# Patient Record
Sex: FEMALE | Race: WHITE | Employment: OTHER | URBAN - METROPOLITAN AREA
[De-identification: names, ages, dates, MRNs, and addresses within clinical notes are randomized per-mention and may not be internally consistent; named-entity substitution may affect disease eponyms.]

---

## 2024-05-24 ENCOUNTER — HOSPITAL ENCOUNTER (EMERGENCY)
Age: 89
Discharge: HOME | End: 2024-05-24
Attending: EMERGENCY MEDICINE
Payer: COMMERCIAL

## 2024-05-24 ENCOUNTER — APPOINTMENT (OUTPATIENT)
Dept: GENERAL RADIOLOGY | Age: 89
End: 2024-05-24
Payer: COMMERCIAL

## 2024-05-24 VITALS
OXYGEN SATURATION: 97 % | RESPIRATION RATE: 18 BRPM | HEART RATE: 76 BPM | SYSTOLIC BLOOD PRESSURE: 198 MMHG | HEIGHT: 66 IN | TEMPERATURE: 98 F | WEIGHT: 132.28 LBS | DIASTOLIC BLOOD PRESSURE: 91 MMHG | BODY MASS INDEX: 21.26 KG/M2

## 2024-05-24 DIAGNOSIS — S91.209A TOENAIL AVULSION, INITIAL ENCOUNTER: Primary | ICD-10-CM

## 2024-05-24 PROCEDURE — 73660 X-RAY EXAM OF TOE(S): CPT

## 2024-05-24 PROCEDURE — 99283 EMERGENCY DEPT VISIT LOW MDM: CPT

## 2024-05-24 RX ORDER — BUPIVACAINE HYDROCHLORIDE 2.5 MG/ML
30 INJECTION, SOLUTION EPIDURAL; INFILTRATION; INTRACAUDAL ONCE
Status: DISCONTINUED | OUTPATIENT
Start: 2024-05-24 | End: 2024-05-24 | Stop reason: HOSPADM

## 2024-05-24 ASSESSMENT — ENCOUNTER SYMPTOMS: RESPIRATORY NEGATIVE: 1

## 2024-05-24 NOTE — DISCHARGE INSTRUCTIONS
You are seen in the emergency department today for evaluation of a left great toe/nail injury.  Your toenail was removed, and a dressing was placed.  Please keep this dressing in place for the next 24 hours, then change the dressing daily, use antibacterial ointment on top of the nonocclusive dressing that was placed on the nailbed.  Please follow-up with podiatry in the next 1 to 2 weeks.  Recommend limiting weightbearing as needed due to pain, and taking Tylenol as needed for pain.    Return to the ED if you develop uncontrollable pain, redness and swelling of the toe, purulent/smelly drainage of the toe

## 2024-05-24 NOTE — ED PROVIDER NOTES
ED Attending Attestation Note     Date of evaluation: 5/24/2024    This patient was seen by the advance practice provider.  I have seen and examined the patient, agree with the workup, evaluation, management and diagnosis. The care plan has been discussed.      The patient's clinical history is as follows:    Chief Complaint     Toe Injury      History of Present Illness     Jonna Erickson is a 89 y.o. female who presents to the Emergency Department after she hit her left toe on a another object yesterday and her great toenail became significantly elevated off of the nailbed and has had residual and continued bleeding since that time.    Past Medical, Surgical, Family, and Social History     She has no past medical history on file.  She has no past surgical history on file.  Her family history is not on file.  She reports that she has never smoked. She does not have any smokeless tobacco history on file. She reports current alcohol use. She reports that she does not use drugs.      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    
  Clinical Impression     1. Toenail avulsion, initial encounter        Disposition     PATIENT REFERRED TO:  No follow-up provider specified.    DISCHARGE MEDICATIONS:  New Prescriptions    No medications on file       DISPOSITION Decision To Discharge 05/24/2024 04:06:33 PM        Diagnostic Results And Other Data         RADIOLOGY:  XR TOE LEFT (MIN 2 VIEWS)   Final Result   1. No acute abnormality of the left great toe.          LABS:   No results found for this visit on 05/24/24.      ED BEDSIDE ULTRASOUND:  No results found.    MOST RECENT VITALS:  BP: (!) 198/91, Temp: 98 °F (36.7 °C), Pulse: 76, Respirations: 18, SpO2: 97 %     Nail Removal    Date/Time: 5/24/2024 4:45 PM    Performed by: Raquel Pascal APRN - CNP  Authorized by: Belia Alonzo MD    Consent:     Consent obtained:  Verbal    Consent given by:  Patient    Risks discussed:  Bleeding, pain and permanent nail deformity  Universal protocol:     Patient identity confirmed:  Verbally with patient  Location:     Foot:  L big toe  Pre-procedure details:     Skin preparation:  Alcohol  Anesthesia:     Anesthesia method:  Nerve block    Block anesthetic:  Bupivacaine 0.25% w/o epi    Block technique:  Digital    Block injection procedure:  Anatomic landmarks identified, introduced needle, incremental injection and negative aspiration for blood    Block outcome:  Anesthesia achieved  Nail Removal:     Nail removed:  Complete    Nail bed repaired: no      Removed nail replaced and anchored: no      Stented with:  Adaptic dressing        ED Course     Nursing Notes, Past Medical Hx, Past Surgical Hx, Social Hx, Allergies, and Family Hx were reviewed.         The patient was given the following medications:  Orders Placed This Encounter   Medications   • BUPivacaine (PF) (MARCAINE) 0.25 % injection 75 mg       CONSULTS:  None    Review of Systems     Review of Systems   Constitutional: Negative.    Respiratory: Negative.     Cardiovascular: Negative.

## 2024-11-08 ENCOUNTER — HOSPITAL ENCOUNTER (INPATIENT)
Age: 89
LOS: 1 days | Discharge: HOME | DRG: 194 | End: 2024-11-09
Attending: EMERGENCY MEDICINE | Admitting: INTERNAL MEDICINE
Payer: MEDICARE

## 2024-11-08 ENCOUNTER — APPOINTMENT (OUTPATIENT)
Dept: GENERAL RADIOLOGY | Age: 89
DRG: 194 | End: 2024-11-08
Payer: MEDICARE

## 2024-11-08 ENCOUNTER — APPOINTMENT (OUTPATIENT)
Dept: CT IMAGING | Age: 89
DRG: 194 | End: 2024-11-08
Payer: MEDICARE

## 2024-11-08 DIAGNOSIS — J90 BILATERAL PLEURAL EFFUSION: ICD-10-CM

## 2024-11-08 DIAGNOSIS — R91.8 MASS OF LOWER LOBE OF LEFT LUNG: Primary | ICD-10-CM

## 2024-11-08 DIAGNOSIS — R91.8 LUNG MASS: ICD-10-CM

## 2024-11-08 LAB
ALBUMIN SERPL-MCNC: 3 G/DL (ref 3.4–5)
ALP SERPL-CCNC: 48 U/L (ref 40–129)
ALT SERPL-CCNC: 8 U/L (ref 10–40)
ANION GAP SERPL CALCULATED.3IONS-SCNC: 8 MMOL/L (ref 3–16)
AST SERPL-CCNC: 27 U/L (ref 15–37)
BASOPHILS # BLD: 0 K/UL (ref 0–0.2)
BASOPHILS NFR BLD: 0.5 %
BILIRUB DIRECT SERPL-MCNC: <0.1 MG/DL (ref 0–0.3)
BILIRUB INDIRECT SERPL-MCNC: 0.2 MG/DL (ref 0–1)
BILIRUB SERPL-MCNC: 0.3 MG/DL (ref 0–1)
BUN SERPL-MCNC: 19 MG/DL (ref 7–20)
CALCIUM SERPL-MCNC: 8.1 MG/DL (ref 8.3–10.6)
CHLORIDE SERPL-SCNC: 97 MMOL/L (ref 99–110)
CO2 SERPL-SCNC: 22 MMOL/L (ref 21–32)
CREAT SERPL-MCNC: 0.8 MG/DL (ref 0.6–1.2)
DEPRECATED RDW RBC AUTO: 16.3 % (ref 12.4–15.4)
EOSINOPHIL # BLD: 0 K/UL (ref 0–0.6)
EOSINOPHIL NFR BLD: 0.3 %
FLUAV RNA RESP QL NAA+PROBE: NOT DETECTED
FLUBV RNA RESP QL NAA+PROBE: NOT DETECTED
GFR SERPLBLD CREATININE-BSD FMLA CKD-EPI: 71 ML/MIN/{1.73_M2}
GLUCOSE SERPL-MCNC: 97 MG/DL (ref 70–99)
HCT VFR BLD AUTO: 39 % (ref 36–48)
HGB BLD-MCNC: 12.6 G/DL (ref 12–16)
LIPASE SERPL-CCNC: 21 U/L (ref 13–60)
LYMPHOCYTES # BLD: 2.2 K/UL (ref 1–5.1)
LYMPHOCYTES NFR BLD: 32.6 %
MAGNESIUM SERPL-MCNC: 1.87 MG/DL (ref 1.8–2.4)
MCH RBC QN AUTO: 28.9 PG (ref 26–34)
MCHC RBC AUTO-ENTMCNC: 32.3 G/DL (ref 31–36)
MCV RBC AUTO: 89.4 FL (ref 80–100)
MONOCYTES # BLD: 0.8 K/UL (ref 0–1.3)
MONOCYTES NFR BLD: 11.8 %
NEUTROPHILS # BLD: 3.8 K/UL (ref 1.7–7.7)
NEUTROPHILS NFR BLD: 54.8 %
OSMOLALITY SERPL: 283 MOSM/KG (ref 278–305)
PHOSPHATE SERPL-MCNC: 3.2 MG/DL (ref 2.5–4.9)
PLATELET # BLD AUTO: 358 K/UL (ref 135–450)
PMV BLD AUTO: 6.5 FL (ref 5–10.5)
POTASSIUM SERPL-SCNC: 4.3 MMOL/L (ref 3.5–5.1)
PROCALCITONIN SERPL IA-MCNC: 0.16 NG/ML (ref 0–0.15)
PROT SERPL-MCNC: 6.4 G/DL (ref 6.4–8.2)
RBC # BLD AUTO: 4.37 M/UL (ref 4–5.2)
SARS-COV-2 RNA RESP QL NAA+PROBE: NOT DETECTED
SODIUM SERPL-SCNC: 127 MMOL/L (ref 136–145)
WBC # BLD AUTO: 6.9 K/UL (ref 4–11)

## 2024-11-08 PROCEDURE — 6360000004 HC RX CONTRAST MEDICATION

## 2024-11-08 PROCEDURE — 80069 RENAL FUNCTION PANEL: CPT

## 2024-11-08 PROCEDURE — 1200000000 HC SEMI PRIVATE

## 2024-11-08 PROCEDURE — 84145 PROCALCITONIN (PCT): CPT

## 2024-11-08 PROCEDURE — 83930 ASSAY OF BLOOD OSMOLALITY: CPT

## 2024-11-08 PROCEDURE — 6360000002 HC RX W HCPCS

## 2024-11-08 PROCEDURE — 36415 COLL VENOUS BLD VENIPUNCTURE: CPT

## 2024-11-08 PROCEDURE — 71260 CT THORAX DX C+: CPT

## 2024-11-08 PROCEDURE — 2580000003 HC RX 258

## 2024-11-08 PROCEDURE — 96374 THER/PROPH/DIAG INJ IV PUSH: CPT

## 2024-11-08 PROCEDURE — 87636 SARSCOV2 & INF A&B AMP PRB: CPT

## 2024-11-08 PROCEDURE — 71045 X-RAY EXAM CHEST 1 VIEW: CPT

## 2024-11-08 PROCEDURE — 96375 TX/PRO/DX INJ NEW DRUG ADDON: CPT

## 2024-11-08 PROCEDURE — 99285 EMERGENCY DEPT VISIT HI MDM: CPT

## 2024-11-08 PROCEDURE — 83735 ASSAY OF MAGNESIUM: CPT

## 2024-11-08 PROCEDURE — 85025 COMPLETE CBC W/AUTO DIFF WBC: CPT

## 2024-11-08 PROCEDURE — 87040 BLOOD CULTURE FOR BACTERIA: CPT

## 2024-11-08 PROCEDURE — 83690 ASSAY OF LIPASE: CPT

## 2024-11-08 PROCEDURE — 80076 HEPATIC FUNCTION PANEL: CPT

## 2024-11-08 PROCEDURE — 6370000000 HC RX 637 (ALT 250 FOR IP)

## 2024-11-08 RX ORDER — VITAMIN B COMPLEX
1000 TABLET ORAL DAILY
Status: DISCONTINUED | OUTPATIENT
Start: 2024-11-09 | End: 2024-11-09 | Stop reason: HOSPADM

## 2024-11-08 RX ORDER — ACETAMINOPHEN 325 MG/1
650 TABLET ORAL EVERY 6 HOURS PRN
Status: DISCONTINUED | OUTPATIENT
Start: 2024-11-08 | End: 2024-11-09 | Stop reason: HOSPADM

## 2024-11-08 RX ORDER — TURMERIC 400 MG
200 CAPSULE ORAL DAILY
COMMUNITY

## 2024-11-08 RX ORDER — ENOXAPARIN SODIUM 100 MG/ML
40 INJECTION SUBCUTANEOUS EVERY EVENING
Status: DISCONTINUED | OUTPATIENT
Start: 2024-11-08 | End: 2024-11-09 | Stop reason: HOSPADM

## 2024-11-08 RX ORDER — TRAZODONE HYDROCHLORIDE 50 MG/1
50 TABLET, FILM COATED ORAL ONCE
Status: DISCONTINUED | OUTPATIENT
Start: 2024-11-08 | End: 2024-11-09 | Stop reason: HOSPADM

## 2024-11-08 RX ORDER — IOPAMIDOL 755 MG/ML
75 INJECTION, SOLUTION INTRAVASCULAR
Status: COMPLETED | OUTPATIENT
Start: 2024-11-08 | End: 2024-11-08

## 2024-11-08 RX ORDER — ONDANSETRON 4 MG/1
4 TABLET, ORALLY DISINTEGRATING ORAL EVERY 8 HOURS PRN
Status: DISCONTINUED | OUTPATIENT
Start: 2024-11-08 | End: 2024-11-09 | Stop reason: HOSPADM

## 2024-11-08 RX ORDER — SODIUM CHLORIDE 9 MG/ML
INJECTION, SOLUTION INTRAVENOUS PRN
Status: DISCONTINUED | OUTPATIENT
Start: 2024-11-08 | End: 2024-11-09 | Stop reason: HOSPADM

## 2024-11-08 RX ORDER — ACETAMINOPHEN 650 MG/1
650 SUPPOSITORY RECTAL EVERY 6 HOURS PRN
Status: DISCONTINUED | OUTPATIENT
Start: 2024-11-08 | End: 2024-11-09 | Stop reason: HOSPADM

## 2024-11-08 RX ORDER — POTASSIUM CHLORIDE 1500 MG/1
40 TABLET, EXTENDED RELEASE ORAL PRN
Status: DISCONTINUED | OUTPATIENT
Start: 2024-11-08 | End: 2024-11-09 | Stop reason: HOSPADM

## 2024-11-08 RX ORDER — SODIUM CHLORIDE 0.9 % (FLUSH) 0.9 %
5-40 SYRINGE (ML) INJECTION PRN
Status: DISCONTINUED | OUTPATIENT
Start: 2024-11-08 | End: 2024-11-09 | Stop reason: HOSPADM

## 2024-11-08 RX ORDER — LABETALOL HYDROCHLORIDE 5 MG/ML
10 INJECTION, SOLUTION INTRAVENOUS ONCE
Status: DISCONTINUED | OUTPATIENT
Start: 2024-11-08 | End: 2024-11-09

## 2024-11-08 RX ORDER — POLYETHYLENE GLYCOL 3350 17 G/17G
17 POWDER, FOR SOLUTION ORAL DAILY PRN
Status: DISCONTINUED | OUTPATIENT
Start: 2024-11-08 | End: 2024-11-09 | Stop reason: HOSPADM

## 2024-11-08 RX ORDER — POTASSIUM CHLORIDE 7.45 MG/ML
10 INJECTION INTRAVENOUS PRN
Status: DISCONTINUED | OUTPATIENT
Start: 2024-11-08 | End: 2024-11-09 | Stop reason: HOSPADM

## 2024-11-08 RX ORDER — MAGNESIUM SULFATE IN WATER 40 MG/ML
2000 INJECTION, SOLUTION INTRAVENOUS PRN
Status: DISCONTINUED | OUTPATIENT
Start: 2024-11-08 | End: 2024-11-09 | Stop reason: HOSPADM

## 2024-11-08 RX ORDER — SODIUM CHLORIDE 0.9 % (FLUSH) 0.9 %
5-40 SYRINGE (ML) INJECTION EVERY 12 HOURS SCHEDULED
Status: DISCONTINUED | OUTPATIENT
Start: 2024-11-08 | End: 2024-11-09 | Stop reason: HOSPADM

## 2024-11-08 RX ORDER — ONDANSETRON 2 MG/ML
4 INJECTION INTRAMUSCULAR; INTRAVENOUS EVERY 6 HOURS PRN
Status: DISCONTINUED | OUTPATIENT
Start: 2024-11-08 | End: 2024-11-09 | Stop reason: HOSPADM

## 2024-11-08 RX ORDER — MECOBALAMIN 5000 MCG
5 TABLET,DISINTEGRATING ORAL ONCE
Status: DISCONTINUED | OUTPATIENT
Start: 2024-11-08 | End: 2024-11-09 | Stop reason: HOSPADM

## 2024-11-08 RX ORDER — UBIDECARENONE 75 MG
100 CAPSULE ORAL DAILY
COMMUNITY

## 2024-11-08 RX ORDER — SIROLIMUS 1 MG/1
1 TABLET, FILM COATED ORAL DAILY
Status: DISCONTINUED | OUTPATIENT
Start: 2024-11-08 | End: 2024-11-08

## 2024-11-08 RX ORDER — SIROLIMUS 1 MG/1
1 TABLET, FILM COATED ORAL DAILY
COMMUNITY
Start: 2024-10-04

## 2024-11-08 RX ORDER — UBIDECARENONE 75 MG
100 CAPSULE ORAL DAILY
Status: DISCONTINUED | OUTPATIENT
Start: 2024-11-09 | End: 2024-11-09 | Stop reason: HOSPADM

## 2024-11-08 RX ORDER — SIROLIMUS 1 MG/1
1 TABLET, FILM COATED ORAL
Status: DISCONTINUED | OUTPATIENT
Start: 2024-11-08 | End: 2024-11-09 | Stop reason: HOSPADM

## 2024-11-08 RX ADMIN — AZITHROMYCIN MONOHYDRATE 500 MG: 500 INJECTION, POWDER, LYOPHILIZED, FOR SOLUTION INTRAVENOUS at 18:10

## 2024-11-08 RX ADMIN — IOPAMIDOL 75 ML: 755 INJECTION, SOLUTION INTRAVENOUS at 16:32

## 2024-11-08 RX ADMIN — ENOXAPARIN SODIUM 40 MG: 100 INJECTION SUBCUTANEOUS at 20:36

## 2024-11-08 RX ADMIN — SODIUM CHLORIDE, PRESERVATIVE FREE 5 ML: 5 INJECTION INTRAVENOUS at 23:13

## 2024-11-08 RX ADMIN — ACETAMINOPHEN 650 MG: 325 TABLET ORAL at 20:36

## 2024-11-08 RX ADMIN — SIROLIMUS 1 MG: 1 TABLET ORAL at 20:36

## 2024-11-08 RX ADMIN — CEFTRIAXONE SODIUM 2000 MG: 2 INJECTION, POWDER, FOR SOLUTION INTRAMUSCULAR; INTRAVENOUS at 17:53

## 2024-11-08 ASSESSMENT — ENCOUNTER SYMPTOMS: COUGH: 1

## 2024-11-08 ASSESSMENT — PAIN - FUNCTIONAL ASSESSMENT: PAIN_FUNCTIONAL_ASSESSMENT: NONE - DENIES PAIN

## 2024-11-08 NOTE — ED PROVIDER NOTES
THE [facility]  EMERGENCY DEPARTMENT ENCOUNTER          EM RESIDENT NOTE       Date of evaluation: 11/8/2024    Chief Complaint     Fever and Cough      History of Present Illness     Jonna Erickson is a 89 y.o. female with past medical history of kidney transplant, hernia repair who presents to the ED with a chief complaint of fatigue and productive cough.  She states that her symptoms all started in early October when she had a virus.  Her symptoms improved, but 3 days ago she did develop a fever of 100.6 Fahrenheit.  Her temperature this morning was 99.6 Fahrenheit.  She notes that she has been feeling fatigued since the virus in early October and has also had some congestion, runny nose, and a productive cough. She denies any shortness of breath or chest pain.  Patient does note some epigastric abdominal pain which she attributes to swallowing the phlegm that she is unable to cough up.  She denies any nausea, vomiting, diarrhea, hematochezia.  Denies any urinary symptoms.     MEDICAL DECISION MAKING / ASSESSMENT / PLAN     INITIAL VITALS: BP: (!) 177/77, Temp: 98.1 °F (36.7 °C), Pulse: 88, Respirations: 20, SpO2: 99 %    Jonna Erickson is a 89 y.o. female with past medical history of kidney transplant from 2007 who presents with fatigue, fever, and productive cough.  Vital signs remarkable for hypertension upon arrival.  Physical exam was otherwise unremarkable.  Laboratory studies showed some hyponatremia with sodium of 127 and slightly elevated procalcitonin of 0.16. No leukocytosis. CXR showed an intermediate left basilar opacity with recommendations to get a CT chest with contrast.  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  It also showed cardiomegaly with bilateral pleural effusions right greater than left as well as polycystic kidneys and hepatic cyst.  Given these findings, I do believe that the patient

## 2024-11-08 NOTE — ED NOTES
How does patient ambulate?   [x]Low Fall Risk (ambulates by themselves without support)  []Stand by assist   []Contact Guard   []Front wheel walker  []Wheelchair   []Steady  []Bed bound  []History of Lower Extremity Amputation  []Unknown, did not assess in the emergency department   How does patient take pills?  [x]Whole with Water  []Crushed in applesauce  []Crushed in pudding  []Other  []Unknown no oral medications were given in the ED  Is patient alert?   [x]Alert  []Drowsy but responds to voice  []Doesn't respond to voice but responds to painful stimuli  []Unresponsive  Is patient oriented?   [x]To person  [x]To place  [x]To time  [x]To situation  []Confused  []Agitated  [x]Follows commands  If patient is disoriented or from a Skill Nursing Facility has family been notified of admission?   []Yes   [x]No  Patient belongings?   [x]Cell phone  []Wallet   []Dentures  [x]Clothing  Any specific patient or family belongings/needs/dynamics?   A minimal amount of blood is leaking from patient's IV but the pt and the family wants to keep it as long as it's working cause she doesn't wanna be poked again, cleaned and redressed it. Head probe used for spO2 not reading accurately on her fingers. Alert and ambulatory  Miscellaneous comments/pending orders?   None    If there are any additional questions please reach out to the Emergency Department.           Escuadra, Marylee, RN  11/08/24 8449

## 2024-11-08 NOTE — ED PROVIDER NOTES
ED Attending Attestation Note     Date of evaluation: 11/8/2024    This patient was seen by the resident.  I have seen and examined the patient, agree with the workup, evaluation, management and diagnosis. The care plan has been discussed.  I have reviewed the ECG and concur with the resident's interpretation. I was present for any procedures performed in the resident's  note and have made edits to the note where appropriate.    My assessment reveals 89 y.o. female with history of renal txp presenting for fatigue and cough.  Here she is alert, in no distress, moderately hypertensive.  Lung sounds diminished at left lung base with occasional crackles/rhonchi not heard elsewhere.  No wheezes or prolonged expiratory phase.  Heart is regular rate and rhythm.     Robbin Gustafson MD  11/08/24 1186

## 2024-11-09 VITALS
OXYGEN SATURATION: 93 % | BODY MASS INDEX: 22.18 KG/M2 | TEMPERATURE: 97.8 F | HEART RATE: 78 BPM | WEIGHT: 138 LBS | HEIGHT: 66 IN | SYSTOLIC BLOOD PRESSURE: 173 MMHG | DIASTOLIC BLOOD PRESSURE: 72 MMHG | RESPIRATION RATE: 18 BRPM

## 2024-11-09 LAB
ANION GAP SERPL CALCULATED.3IONS-SCNC: 7 MMOL/L (ref 3–16)
BACTERIA URNS QL MICRO: ABNORMAL /HPF
BASOPHILS # BLD: 0 K/UL (ref 0–0.2)
BASOPHILS NFR BLD: 0.4 %
BILIRUB UR QL STRIP.AUTO: NEGATIVE
BUN SERPL-MCNC: 21 MG/DL (ref 7–20)
CALCIUM SERPL-MCNC: 8.1 MG/DL (ref 8.3–10.6)
CHLORIDE SERPL-SCNC: 99 MMOL/L (ref 99–110)
CLARITY UR: CLEAR
CO2 SERPL-SCNC: 23 MMOL/L (ref 21–32)
COLOR UR: YELLOW
CREAT SERPL-MCNC: 0.9 MG/DL (ref 0.6–1.2)
DEPRECATED RDW RBC AUTO: 15.9 % (ref 12.4–15.4)
EOSINOPHIL # BLD: 0.1 K/UL (ref 0–0.6)
EOSINOPHIL NFR BLD: 1.4 %
EPI CELLS #/AREA URNS HPF: ABNORMAL /HPF (ref 0–5)
GFR SERPLBLD CREATININE-BSD FMLA CKD-EPI: 62 ML/MIN/{1.73_M2}
GLUCOSE SERPL-MCNC: 83 MG/DL (ref 70–99)
GLUCOSE UR STRIP.AUTO-MCNC: NEGATIVE MG/DL
HCT VFR BLD AUTO: 34.7 % (ref 36–48)
HGB BLD-MCNC: 11.4 G/DL (ref 12–16)
HGB UR QL STRIP.AUTO: ABNORMAL
KETONES UR STRIP.AUTO-MCNC: NEGATIVE MG/DL
LEUKOCYTE ESTERASE UR QL STRIP.AUTO: NEGATIVE
LYMPHOCYTES # BLD: 2.7 K/UL (ref 1–5.1)
LYMPHOCYTES NFR BLD: 41.4 %
MAGNESIUM SERPL-MCNC: 1.96 MG/DL (ref 1.8–2.4)
MCH RBC QN AUTO: 29.1 PG (ref 26–34)
MCHC RBC AUTO-ENTMCNC: 32.8 G/DL (ref 31–36)
MCV RBC AUTO: 88.6 FL (ref 80–100)
MONOCYTES # BLD: 1.2 K/UL (ref 0–1.3)
MONOCYTES NFR BLD: 18.4 %
NEUTROPHILS # BLD: 2.5 K/UL (ref 1.7–7.7)
NEUTROPHILS NFR BLD: 38.4 %
NITRITE UR QL STRIP.AUTO: NEGATIVE
OSMOLALITY UR: 446 MOSM/KG (ref 390–1070)
PH UR STRIP.AUTO: 6 [PH] (ref 5–8)
PLATELET # BLD AUTO: 335 K/UL (ref 135–450)
PMV BLD AUTO: 6.1 FL (ref 5–10.5)
POTASSIUM SERPL-SCNC: 4.3 MMOL/L (ref 3.5–5.1)
PROT UR STRIP.AUTO-MCNC: NEGATIVE MG/DL
RBC # BLD AUTO: 3.92 M/UL (ref 4–5.2)
RBC #/AREA URNS HPF: ABNORMAL /HPF (ref 0–4)
SODIUM SERPL-SCNC: 129 MMOL/L (ref 136–145)
SODIUM SERPL-SCNC: 129 MMOL/L (ref 136–145)
SODIUM UR-SCNC: 38 MMOL/L
SP GR UR STRIP.AUTO: 1.01 (ref 1–1.03)
UA DIPSTICK W REFLEX MICRO PNL UR: YES
URN SPEC COLLECT METH UR: ABNORMAL
UROBILINOGEN UR STRIP-ACNC: 0.2 E.U./DL
WBC # BLD AUTO: 6.6 K/UL (ref 4–11)
WBC #/AREA URNS HPF: ABNORMAL /HPF (ref 0–5)

## 2024-11-09 PROCEDURE — 36415 COLL VENOUS BLD VENIPUNCTURE: CPT

## 2024-11-09 PROCEDURE — 83735 ASSAY OF MAGNESIUM: CPT

## 2024-11-09 PROCEDURE — 83935 ASSAY OF URINE OSMOLALITY: CPT

## 2024-11-09 PROCEDURE — 84295 ASSAY OF SERUM SODIUM: CPT

## 2024-11-09 PROCEDURE — 81001 URINALYSIS AUTO W/SCOPE: CPT

## 2024-11-09 PROCEDURE — 80048 BASIC METABOLIC PNL TOTAL CA: CPT

## 2024-11-09 PROCEDURE — 6370000000 HC RX 637 (ALT 250 FOR IP)

## 2024-11-09 PROCEDURE — 2580000003 HC RX 258

## 2024-11-09 PROCEDURE — 84300 ASSAY OF URINE SODIUM: CPT

## 2024-11-09 PROCEDURE — 6370000000 HC RX 637 (ALT 250 FOR IP): Performed by: INTERNAL MEDICINE

## 2024-11-09 PROCEDURE — 99223 1ST HOSP IP/OBS HIGH 75: CPT | Performed by: INTERNAL MEDICINE

## 2024-11-09 PROCEDURE — 85025 COMPLETE CBC W/AUTO DIFF WBC: CPT

## 2024-11-09 RX ORDER — CEFDINIR 300 MG/1
300 CAPSULE ORAL EVERY 12 HOURS SCHEDULED
Qty: 8 CAPSULE | Refills: 0 | Status: CANCELLED | OUTPATIENT
Start: 2024-11-09 | End: 2024-11-13

## 2024-11-09 RX ORDER — AZITHROMYCIN 250 MG/1
250 TABLET, FILM COATED ORAL DAILY
Qty: 4 TABLET | Refills: 0 | Status: CANCELLED | OUTPATIENT
Start: 2024-11-09 | End: 2024-11-13

## 2024-11-09 RX ORDER — LABETALOL HYDROCHLORIDE 5 MG/ML
10 INJECTION, SOLUTION INTRAVENOUS EVERY 6 HOURS PRN
Status: DISCONTINUED | OUTPATIENT
Start: 2024-11-09 | End: 2024-11-09 | Stop reason: HOSPADM

## 2024-11-09 RX ORDER — LEVOFLOXACIN 500 MG/1
750 TABLET, FILM COATED ORAL DAILY
Status: CANCELLED | OUTPATIENT
Start: 2024-11-09 | End: 2024-11-14

## 2024-11-09 RX ORDER — CEFDINIR 300 MG/1
300 CAPSULE ORAL EVERY 12 HOURS SCHEDULED
Status: DISCONTINUED | OUTPATIENT
Start: 2024-11-09 | End: 2024-11-09

## 2024-11-09 RX ORDER — AZITHROMYCIN 250 MG/1
250 TABLET, FILM COATED ORAL DAILY
Status: DISCONTINUED | OUTPATIENT
Start: 2024-11-09 | End: 2024-11-09

## 2024-11-09 RX ADMIN — Medication 1000 UNITS: at 07:50

## 2024-11-09 RX ADMIN — VITAM B12 100 MCG: 100 TAB at 07:50

## 2024-11-09 RX ADMIN — AMOXICILLIN AND CLAVULANATE POTASSIUM 1 TABLET: 875; 125 TABLET, FILM COATED ORAL at 15:48

## 2024-11-09 RX ADMIN — SODIUM CHLORIDE, PRESERVATIVE FREE 10 ML: 5 INJECTION INTRAVENOUS at 07:50

## 2024-11-09 ASSESSMENT — ENCOUNTER SYMPTOMS: COUGH: 1

## 2024-11-09 NOTE — PROGRESS NOTES
Patient Jonna Erickson to room 5316 from ED. Patient is A&O x 4. VSS. Patient oriented to the room all safety measures in place. Patient given IS and SCDs at this time. Admission orders released and patient 4 eyes completed. Admission documentation completed. No other needs are noted at this time.    [x] Bed alarm on and cord plugged into wall  [x] Bed in lowest position  [x] Call light and bedside table within reach  [x] Patient educated on all safety measures  []Oxygen connected to wall (if applicable)     Nurse 1 Esignature: Electronically signed by Kartik Keenan RN on 11/8/24 at 10:59 PM EST  Nurse 2 Esignature: Electronically signed by Cha Santana RN on 11/9/24 at 1:38 AM EST

## 2024-11-09 NOTE — PROGRESS NOTES
Internal Medicine Progress Note    Admit Date: 11/8/2024  Day: 2  Diet: ADULT DIET; Regular; 1800 ml    CC: Fatigue, weakness    Interval history: Patient is doing well and is excited to eat lunch. She has mild coughing upon deep inspiration but is not on any oxygen supplementation and feels well overall with no shortness of breath. We are trending her sodium to make sure it rises. It has gone from 127 > 129. We will check labs later this afternoon to make sure it is stable, and we can likely discharge her so she can receive outpatient biopsy of her lung mass. To empirically treat for pneumonia, we can give 4 days of Cefdinir and Azithromycin.    HPI: Jonna Erickson is a 89 y.o. female with past medical history of kidney transplant in 2007, hernia repair who presents to the ED with a chief complaint of fatigue and productive cough for several weeks in duration. She states that her symptoms all started in early October when she had a virus of some sort. She stated that she felt fatigued with a cough that caused her discomfort every time she bent down or sat up, but was alleviated while laying down. She did not seek medical attention or take any medications for this. Her symptoms improved only slightly, but 3 days ago she did develop a fever of 100.6 Fahrenheit and took a tylenol. She also stated that she took a shower and had to hold on to the wall because she felt very weak in her muscles. She denies any falls. Her temperature this morning was 99.6 Fahrenheit. She endorses persistent congestion, productive cough, runny nose. She denies any shortness of breath or chest pain.  Patient does note some epigastric abdominal pain which she attributes to swallowing the phlegm that she is unable to cough up. She denies any nausea, vomiting, diarrhea, hematochezia. Denies any urinary symptoms.     Medications:     Scheduled Meds:   azithromycin  250 mg Oral Daily    cefdinir  300 mg Oral 2 times per day    sodium

## 2024-11-09 NOTE — PLAN OF CARE
Problem: Discharge Planning  Goal: Discharge to home or other facility with appropriate resources  11/9/2024 0952 by Rosario Truong, RN  Flowsheets (Taken 11/9/2024 0952)  Discharge to home or other facility with appropriate resources:   Identify barriers to discharge with patient and caregiver   Arrange for needed discharge resources and transportation as appropriate  11/9/2024 0403 by Kartik Keenan RN  Outcome: Progressing

## 2024-11-09 NOTE — DISCHARGE INSTRUCTIONS
Please continue to take the Abx (Cefdinir and Azithromycin) for another 4 days as prescribed. Make sure to drink only up to 2 Liters daily. Please follow up with Dr. Harris, the Pulmonologist, for a biopsy. Thank you! It was randy to meet you :)

## 2024-11-09 NOTE — H&P
Internal Medicine H&P    Date: 2024   Patient: Jonna Erickson   Patient : 1935     CC: Fever and Cough       Subjective     HPI:    Jonna Erickson is a 89 y.o. female with past medical history of kidney transplant in , hernia repair who presents to the ED with a chief complaint of fatigue and productive cough for several weeks in duration. She states that her symptoms all started in early October when she had a virus of some sort. She stated that she felt fatigued with a cough that caused her discomfort every time she bent down or sat up, but was alleviated while laying down. She did not seek medical attention or take any medications for this. Her symptoms improved only slightly, but 3 days ago she did develop a fever of 100.6 Fahrenheit and took a tylenol. She also stated that she took a shower and had to hold on to the wall because she felt very weak in her muscles. She denies any falls. Her temperature this morning was 99.6 Fahrenheit. She endorses persistent congestion, productive cough, runny nose. She denies any shortness of breath or chest pain.  Patient does note some epigastric abdominal pain which she attributes to swallowing the phlegm that she is unable to cough up. She denies any nausea, vomiting, diarrhea, hematochezia. Denies any urinary symptoms.     PMHx:  History reviewed. No pertinent past medical history.    PSurgHx:      Procedure Laterality Date    KIDNEY TRANSPLANT          Medication:  Not in a hospital admission.    Allergies:  Patient has no known allergies.    SocHx:  Social History     Socioeconomic History    Marital status:      Spouse name: None    Number of children: None    Years of education: None    Highest education level: None   Tobacco Use    Smoking status: Never   Substance and Sexual Activity    Alcohol use: Yes     Comment: occ    Drug use: Never        FHx:  History reviewed. No pertinent family history.    ROS:  Review of Systems   Constitutional:  fatigue and productive cough and chest discomfort. Pt reported a fever of 100.6 several days prior to admission. Denies any change in eating habits. CXR was obtained in ED which recommended CT for further eval of an indeterminate left basilar opacity. CT chest revealed large left lower lobe mass measuring 8.0 x 6.1cm with small right pleural effusions, right greater than left. Possible pneumonia causing pleural effusions, although likely it is the malignancy. Procal was slightly elevated at 0.16, COVID negative. Patient arrived hypertensive at 177/77 with RR of 20 satting at 100% RA. Given azithromycin and ceftriaxone in ED.  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   Patient presents with diagnosis of new lung mass and sodium of 127. Patient is well appearing and denies any change in eating habits such as drinking more or less than normal. Appears euvolemic on physical exam, no JVD or leg edema.  -Serum osm/urine osm/ urine Na  -Fluid restriction for 1500ml per day     Polycystic Kidney Disease S/p Kidney Transplant in 2007  Patient has had no complications since kidney transplant.. Follows with [facility].  -Sirolimus  -Vitamin D    DVT PPx: Lovenox  Diet: ADULT DIET; Regular; 1500 ml   Code status:  Full Code     ELOS: 5  Disposition  - Preadmission: home  - Upon discharge: home    Will discuss with attending physician Dr. Freed, Stephan GRANGER, *     Kaity Gilliam,   Internal Medicine, PGY-1

## 2024-11-09 NOTE — CONSULTS
Pulmonary Consult Note      Reason for Consult: lung mass, history of renal transplant   Requesting Physician: Chacorta    Subjective:     CHIEF COMPLAINT / HPI:                The patient is a 89 y.o. female with significant past medical history of polycystic kidney disease presented with complaints of low grade fevers, cough and low energy for a month.  Patient is an active 89 year old who had her renal transplant in 2007.  She's done very well with it and has been on Sirolimus for immune suppression.  She started feeling ill around the same time as several other family members, but everyone else got better, but her.  She felt worse this past week and her  and son (both physicians) convinced her to come to the hospital.     Past Medical History:  History reviewed. No pertinent past medical history.   Past Surgical History:        Procedure Laterality Date    KIDNEY TRANSPLANT       Current Medications:     sodium chloride flush  5-40 mL IntraVENous 2 times per day    [Held by provider] enoxaparin  40 mg SubCUTAneous QPM    vitamin B-12  100 mcg Oral Daily    Vitamin D  1,000 Units Oral Daily    sirolimus  1 mg Oral Q MWF    melatonin  5 mg Oral Once    traZODone  50 mg Oral Once     Allergies:  No Known Allergies  Social History:    TOBACCO:   reports that she has never smoked. She does not have any smokeless tobacco history on file.  ETOH:   reports current alcohol use.    Family History:   History reviewed. No pertinent family history.    REVIEW OF SYSTEMS:    CONSTITUTIONAL:  negative for fevers, chills, diaphoresis, activity change, appetite change, fatigue, night sweats and unexpected weight change.   EYES:  negative for blurred vision, eye discharge, visual disturbance and icterus  HEENT:  negative for hearing loss, tinnitus, ear drainage, sinus pressure, nasal congestion, epistaxis and snoring  RESPIRATORY:  See HPI  CARDIOVASCULAR:  negative for chest pain, palpitations, exertional chest  anxiety.  MUSCULOSKELETAL: No obvious misalignment or effusion of the joints. No clubbing, cyanosis of the digits.  SKIN:  normal skin color, texture, turgor and no redness, warmth, or swelling. No palpable nodules    DATA:    Old records have been reviewed  CBC with Differential:    Lab Results   Component Value Date/Time    WBC 6.6 11/09/2024 05:05 AM    RBC 3.92 11/09/2024 05:05 AM    HGB 11.4 11/09/2024 05:05 AM    HCT 34.7 11/09/2024 05:05 AM     11/09/2024 05:05 AM    MCV 88.6 11/09/2024 05:05 AM    MCH 29.1 11/09/2024 05:05 AM    MCHC 32.8 11/09/2024 05:05 AM    RDW 15.9 11/09/2024 05:05 AM    LYMPHOPCT 41.4 11/09/2024 05:05 AM    MONOPCT 18.4 11/09/2024 05:05 AM    EOSPCT 1.4 11/09/2024 05:05 AM    BASOPCT 0.4 11/09/2024 05:05 AM    MONOSABS 1.2 11/09/2024 05:05 AM    LYMPHSABS 2.7 11/09/2024 05:05 AM    EOSABS 0.1 11/09/2024 05:05 AM    BASOSABS 0.0 11/09/2024 05:05 AM     BMP:    Lab Results   Component Value Date/Time     11/09/2024 05:05 AM    K 4.3 11/09/2024 05:05 AM    CL 99 11/09/2024 05:05 AM    CO2 23 11/09/2024 05:05 AM    BUN 21 11/09/2024 05:05 AM    CREATININE 0.9 11/09/2024 05:05 AM    CALCIUM 8.1 11/09/2024 05:05 AM    LABGLOM 62 11/09/2024 05:05 AM    GLUCOSE 83 11/09/2024 05:05 AM     Hepatic Function Panel:    Lab Results   Component Value Date/Time    ALKPHOS 48 11/08/2024 03:25 PM    ALT 8 11/08/2024 03:25 PM    AST 27 11/08/2024 03:25 PM    BILITOT 0.3 11/08/2024 03:25 PM    BILIDIR <0.1 11/08/2024 03:25 PM    IBILI 0.2 11/08/2024 03:25 PM     ABG:  No results found for: \"CWP8AWH\", \"BEART\", \"X6LQPBYC\", \"PHART\", \"THGBART\", \"BCZ9CUV\", \"PO2ART\", \"UHB1STY\"  Procalcitonin:    Lab Results   Component Value Date/Time    PROCAL 0.16 11/08/2024 03:25 PM     Cultures:   Blood Culture:    Sputum Culture:        Radiology Review:  All pertinent images / reports were reviewed as a part of this visit. imaging reveals the following:  CT CHEST W CONTRAST   Final Result      1. Large left

## 2024-11-09 NOTE — PROGRESS NOTES
4 Eyes Skin Assessment     NAME:  Jonna Erickson  YOB: 1935  MEDICAL RECORD NUMBER:  [id number]    The patient is being assessed for  Admission    I agree that at least one RN has performed a thorough Head to Toe Skin Assessment on the patient. ALL assessment sites listed below have been assessed.      Areas assessed by both nurses:    Head, Face, Ears, Shoulders, Back, Chest, Arms, Elbows, Hands, Sacrum. Buttock, Coccyx, Ischium, Legs. Feet and Heels, Under Medical Devices , and Other          Does the Patient have a Wound? No noted wound(s)       Eric Prevention initiated by RN: No  Wound Care Orders initiated by RN: No    Pressure Injury (Stage 3,4, Unstageable, DTI, NWPT, and Complex wounds) if present, place Wound referral order by RN under : No    New Ostomies, if present place, Ostomy referral order under : No     Nurse 1 eSignature: Electronically signed by Kartik Keenan RN on 11/8/24 at 10:59 PM EST    **SHARE this note so that the co-signing nurse can place an eSignature**    Nurse 2 eSignature: Electronically signed by Cha Santana RN on 11/9/24 at 1:38 AM EST

## 2024-11-10 LAB
BACTERIA BLD CULT ORG #2: NORMAL
BACTERIA BLD CULT: NORMAL

## 2024-11-12 ENCOUNTER — TELEPHONE (OUTPATIENT)
Dept: PULMONOLOGY | Age: 89
End: 2024-11-12

## 2024-11-12 NOTE — TELEPHONE ENCOUNTER
Patient is scheduled on 11/18/2024 @ 10:30 am for a CT Guided Needle Biopsy.  When would you like to see her for a follow up appt to discuss results?

## 2024-11-12 NOTE — TELEPHONE ENCOUNTER
We probably won't have results until the end of that week, so the earliest would be the week of Thanksgiving (I've got full days M, T that week) or the following week.  My bias would be the week after Thanksgiving because, if it's a lymphoproliferative disorder the diagnosis takes longer to get back.

## 2024-11-13 LAB
BACTERIA BLD CULT ORG #2: NORMAL
BACTERIA BLD CULT: NORMAL

## 2024-11-19 ENCOUNTER — HOSPITAL ENCOUNTER (OUTPATIENT)
Dept: CT IMAGING | Age: 89
Discharge: HOME | End: 2024-11-19
Attending: INTERNAL MEDICINE
Payer: MEDICARE

## 2024-11-19 ENCOUNTER — HOSPITAL ENCOUNTER (OUTPATIENT)
Dept: GENERAL RADIOLOGY | Age: 89
Discharge: HOME | End: 2024-11-19
Attending: INTERNAL MEDICINE
Payer: MEDICARE

## 2024-11-19 VITALS
SYSTOLIC BLOOD PRESSURE: 152 MMHG | TEMPERATURE: 97.8 F | OXYGEN SATURATION: 93 % | DIASTOLIC BLOOD PRESSURE: 69 MMHG | BODY MASS INDEX: 22.18 KG/M2 | RESPIRATION RATE: 18 BRPM | HEIGHT: 66 IN | HEART RATE: 88 BPM | WEIGHT: 138 LBS

## 2024-11-19 DIAGNOSIS — R91.8 MASS OF LOWER LOBE OF LEFT LUNG: ICD-10-CM

## 2024-11-19 DIAGNOSIS — R91.8 LUNG MASS: ICD-10-CM

## 2024-11-19 LAB
APTT BLD: 30.2 SEC (ref 22.1–36.4)
INR PPP: 1.09 (ref 0.85–1.15)
PLATELET # BLD AUTO: 515 K/UL (ref 135–450)
PROTHROMBIN TIME: 14.3 SEC (ref 11.9–14.9)

## 2024-11-19 PROCEDURE — 71045 X-RAY EXAM CHEST 1 VIEW: CPT

## 2024-11-19 PROCEDURE — 36415 COLL VENOUS BLD VENIPUNCTURE: CPT

## 2024-11-19 PROCEDURE — 99152 MOD SED SAME PHYS/QHP 5/>YRS: CPT

## 2024-11-19 PROCEDURE — 88305 TISSUE EXAM BY PATHOLOGIST: CPT

## 2024-11-19 PROCEDURE — 2500000003 HC RX 250 WO HCPCS: Performed by: STUDENT IN AN ORGANIZED HEALTH CARE EDUCATION/TRAINING PROGRAM

## 2024-11-19 PROCEDURE — 88341 IMHCHEM/IMCYTCHM EA ADD ANTB: CPT

## 2024-11-19 PROCEDURE — 88342 IMHCHEM/IMCYTCHM 1ST ANTB: CPT

## 2024-11-19 PROCEDURE — 85049 AUTOMATED PLATELET COUNT: CPT

## 2024-11-19 PROCEDURE — 85610 PROTHROMBIN TIME: CPT

## 2024-11-19 PROCEDURE — 6360000002 HC RX W HCPCS: Performed by: STUDENT IN AN ORGANIZED HEALTH CARE EDUCATION/TRAINING PROGRAM

## 2024-11-19 PROCEDURE — 85730 THROMBOPLASTIN TIME PARTIAL: CPT

## 2024-11-19 RX ORDER — FENTANYL CITRATE 50 UG/ML
INJECTION, SOLUTION INTRAMUSCULAR; INTRAVENOUS PRN
Status: COMPLETED | OUTPATIENT
Start: 2024-11-19 | End: 2024-11-19

## 2024-11-19 RX ORDER — LIDOCAINE HYDROCHLORIDE 10 MG/ML
INJECTION, SOLUTION EPIDURAL; INFILTRATION; INTRACAUDAL; PERINEURAL PRN
Status: COMPLETED | OUTPATIENT
Start: 2024-11-19 | End: 2024-11-19

## 2024-11-19 RX ORDER — MIDAZOLAM HYDROCHLORIDE 1 MG/ML
INJECTION, SOLUTION INTRAMUSCULAR; INTRAVENOUS PRN
Status: COMPLETED | OUTPATIENT
Start: 2024-11-19 | End: 2024-11-19

## 2024-11-19 RX ADMIN — FENTANYL CITRATE 25 MCG: 50 INJECTION, SOLUTION INTRAMUSCULAR; INTRAVENOUS at 09:17

## 2024-11-19 RX ADMIN — MIDAZOLAM HYDROCHLORIDE 1 MG: 2 INJECTION, SOLUTION INTRAMUSCULAR; INTRAVENOUS at 09:17

## 2024-11-19 RX ADMIN — LIDOCAINE HYDROCHLORIDE 10 ML: 10 INJECTION, SOLUTION EPIDURAL; INFILTRATION; INTRACAUDAL; PERINEURAL at 09:20

## 2024-11-19 ASSESSMENT — PAIN - FUNCTIONAL ASSESSMENT
PAIN_FUNCTIONAL_ASSESSMENT: NONE - DENIES PAIN
PAIN_FUNCTIONAL_ASSESSMENT: 0-10

## 2024-11-19 NOTE — PROCEDURES
Interventional Radiology Post Procedure    Date: 11/19/2024    Physician: Williams Beal MD    Pre-op Diagnosis: left lower lobe mass    Post-op Diagnosis: same    Variation from Planned Procedure: None       Findings: successful left lower lobe mass biopsy    Patient condition: Stable    Estimated Blood Loss: 1 cc    Specimens:  20G core x3 submitted in formalin      Signed,  Alexandru Beal MD  9:42 AM  11/19/2024

## 2024-11-19 NOTE — PROGRESS NOTES
IR nurse called for information regarding patients X-Ray.  IR nurse stated she would ask the radiologist and get back with note writer.

## 2024-11-19 NOTE — SEDATION DOCUMENTATION
IMAGING SERVICES NURSING PROGRESS NOTE    Procedure:  LL lung  November 19, 2024  Jonna Erickson      Allergies:  No Known Allergies    Vitals:    11/19/24 0918   BP: (!) 153/76   Pulse: 79   Resp: 16   Temp:    SpO2: 98%       Recent lab work reviewed with MD: yes   Procedure explained to patient by MD: yes   Informed consent obtained:yes  Family with patient: Yes    Mental Status:  Normal  Readiness to learn:  Yes  Barriers to learning: No    Pain Assessment Pre-Procedure:  Pain Present:  no  Pain Score:  0  Pain Quality/Description:  none    Time out Procedure Verification with:  [x] RN  [x] Physician  [x] Patient  [x] Other: CT Technologist  Procedure site marked, if applicable:  Yes    Note: Patient arrived A & O x 4, denies pain, breathing easily on room air, Spoke to Dr. Williams Beal prior to procedure.  Procedural sedation: Put on 2L of O2 via nasal canula  Fentanyl:  25 mcg  Versed:    1 mg  Post Procedureal Note:  Patient tolerated procedure well.  Breathing easily on room air.  Report given to [facility] RN.  Patient transported in stable conditon to room 7.    Pain Assessment Post-Procedure:  Pain Present:  no  Pain Score:  0  Pain Quality/Description:  none    Plan of Care Goals:  Safety measures met:  Yes  Patient understands explanation of procedure:  Yes    Time in:  0918  Time out:  0933  Hilda Sesay RN.  R.N. 11/19/2024

## 2024-11-19 NOTE — FLOWSHEET NOTE
2nd CXR performed. Pt denies needs or complaints at this time.  Biopsy site to left mid back unremarkable at this time. No crepitus noted.

## 2024-11-19 NOTE — PROGRESS NOTES
Ambulatory Surgery/Procedure Discharge Note    Vitals:    11/19/24 1330   BP: (!) 152/69   Pulse: 88   Resp: 18   Temp: 97.8 °F (36.6 °C)   SpO2: 93%       No intake/output data recorded.    Restroom use offered before discharge.  Yes    Pain assessment:  level of pain (1-10, 10 severe),   Pain Level: 0    Patient discharged to home with family     Patient discharged to home/self care. Patient discharged via wheel chair by transporter to waiting family/S.O.       11/19/2024 2:48 PM

## 2024-11-19 NOTE — FLOWSHEET NOTE
Received pt to same day surgery and report received from IR RN.   Pulse ox has been running in the upper 80's goes to 92 if pt takes a deep breath.   2LO2/NC started at this time with pulse ox rising to 94%.

## 2024-11-19 NOTE — DISCHARGE INSTRUCTIONS
call for help?   Call 911 anytime you think you may need emergency care. For example, call if:    You have trouble breathing.     You passed out (lost consciousness).   Call your doctor now or seek immediate medical care if:    You have nausea or vomiting that gets worse or won't stop.     You have a fever.     You have a new or worse headache.     The medicine is not wearing off and you can't think clearly.   Watch closely for changes in your health, and be sure to contact your doctor if:    You do not get better as expected.         Discharge Instructions for Needle Biopsy of the Lung     ACTIVITY:   Rest for the first 24-48 hours.  A responsible person should be with you for 24 hours  Under most circumstances, patients are advised to wait 24-48 hours prior to driving and working after a needle biopsy of the lung.     Avoid strenuous activities for one week.      Avoid lifting anything 10 pounds (4.5 kilograms) or heavier for the 3-4 days after your procedure.      As difficult as it may be, try not to cough during the first 24 hours after the biopsy.    Blood in sputum (bright red or pink tinged) may occur and is self limiting, if persistent call you doctor    MEDICATION INSTRUCTIONS:  [x]Resume medications as listed:   [x]You may take a non-prescription “headache remedy”, preferably one that does not contain aspirin.  [x]Give the list of your medications to your primary care physician on your next visit. Keep your med  list updated and carry it with in case of emergencies.    If you had to stop blood thinning medicines before the procedure, you may resume day after procedure unless otherwise directed by your prescribing doctor. Medicines often stopped include:    Anti-inflammatory drugs (eg, aspirin )   Coumadin (warfarin) or Plavix (clopidogrel)    DIET INSTRUCTIONS:  Resume your normal diet  WOUND/DRESSING INSTRUCTIONS:      Always clean your hands before and after caring for the wound.  [x]May shower

## 2024-11-19 NOTE — H&P
Patient:  Jonna Erickson   :   1935      Relevant clinical history, particularly as it involves the pending procedure, was reviewed and discussed.    The procedure including risks and benefits was discussed at length with the patient (or designated family member) and all questions were answered.  Informed consent to proceed with the procedure was given.    Vital signs were monitored and documented by the Radiology nurse.    Targeted physical examination  Heart : regular rate and rhythm  Lungs : clear, breathing easily  Condition : stable    [facility] nurses notes reviewed and agreed.    Past Medical History:    No past medical history on file.    Past Surgical History:           Procedure Laterality Date    KIDNEY TRANSPLANT         Allergies:  Patient has no known allergies.    Medications:   Home Meds  Current Outpatient Medications on File Prior to Encounter   Medication Sig Dispense Refill    sirolimus (RAPAMUNE) 1 MG tablet Take 1 tablet by mouth daily On Monday, Wed, Friday      vitamin D 25 MCG (1000 UT) CAPS Take 1 capsule by mouth daily      vitamin B-12 (CYANOCOBALAMIN) 100 MCG tablet Take 1 tablet by mouth daily      TURMERIC PO Take by mouth      Turmeric 400 MG CAPS Take 200 capsules by mouth daily       No current facility-administered medications on file prior to encounter.       Current Meds  No current facility-administered medications for this encounter.        ASA 1 - Normal health patient    II (soft palate, uvula, fauces visible)    Activity:  2 - Able to move 4 extremities voluntarily on command  Respiration:  2 - Able to breathe deeply and cough freely  Circulation:  2 - BP+/- 20mmHg of normal  Consciousness:  2 - Fully awake  Oxygen Saturation (color):  2 - Able to maintain oxygen saturation >92% on room air    Sedation : Moderate sedation planned

## 2024-11-19 NOTE — FLOWSHEET NOTE
Bandaid dressing to left mid back clean dry and intact. No edema, redness, drainage or crepitus noted.

## 2024-11-23 ENCOUNTER — TELEPHONE (OUTPATIENT)
Dept: PULMONOLOGY | Age: 89
End: 2024-11-23

## 2024-11-23 RX ORDER — ONDANSETRON 4 MG/1
4 TABLET, ORALLY DISINTEGRATING ORAL 3 TIMES DAILY PRN
Qty: 21 TABLET | Refills: 0 | Status: SHIPPED | OUTPATIENT
Start: 2024-11-23

## 2024-11-23 RX ORDER — DICYCLOMINE HYDROCHLORIDE 10 MG/1
10 CAPSULE ORAL 3 TIMES DAILY PRN
Qty: 21 CAPSULE | Refills: 0 | Status: SHIPPED | OUTPATIENT
Start: 2024-11-23

## 2024-11-23 NOTE — TELEPHONE ENCOUNTER
Do labs  Start methotrexate 4 tabs once a week  Start folic acid daily  Will work on getting you started on Krystexxa infusions every 2 weeks at the [address] [place] [address]  Follow gout diet  Can try diclofenac gel as needed for joint pain    Return to clinic in 4 months Patient daughter called  Patient had CT guided biopsy 4 days ago.      She is complaining of colicky abdominal pain around the umbilicus since.     Of note she had diarrhea before the biopsy and she was on amoxicillin.    There is no diarrhea at this time.  She has intermittent nausea but no vomiting.    Her appetite is poor, she is able to have liquid diet.    There is no fever or chills  There is no SOB or pain with breathing.      CT scans reviewed.  Biopsy is not close to the area where she has pain and I doubt this pain is related to it.    Pain is colicky and likely due to resolving gastroenteritis.      Will prescribe zofran and small dose bentyl.  Risks of adverse effects discussed briefly.    Advised to get checked out if pain continues beyond 3-4 days.

## 2024-11-26 ENCOUNTER — TELEPHONE (OUTPATIENT)
Dept: PULMONOLOGY | Age: 89
End: 2024-11-26

## 2024-11-26 NOTE — TELEPHONE ENCOUNTER
Shaylee's daughter called..    Has palliative care appt today 2:45pm    New onset worsening of fatigue  Spo2 at 91%  HR 91    They would like a return call on options available

## 2024-11-26 NOTE — TELEPHONE ENCOUNTER
I spoke with Mrs. Erickson's daughter and they were at the palliative care appointment.  They're going to call back if they need anything.

## 2024-12-04 ENCOUNTER — HOSPITAL ENCOUNTER (INPATIENT)
Age: 89
LOS: 2 days | Discharge: HOME | DRG: 054 | End: 2024-12-06
Attending: STUDENT IN AN ORGANIZED HEALTH CARE EDUCATION/TRAINING PROGRAM | Admitting: INTERNAL MEDICINE
Payer: MEDICARE

## 2024-12-04 ENCOUNTER — APPOINTMENT (OUTPATIENT)
Dept: GENERAL RADIOLOGY | Age: 89
DRG: 054 | End: 2024-12-04
Payer: MEDICARE

## 2024-12-04 ENCOUNTER — HOSPITAL ENCOUNTER (OUTPATIENT)
Dept: MRI IMAGING | Age: 89
Discharge: HOME | End: 2024-12-04
Attending: STUDENT IN AN ORGANIZED HEALTH CARE EDUCATION/TRAINING PROGRAM
Payer: MEDICARE

## 2024-12-04 DIAGNOSIS — C43.8 MALIGNANT MELANOMA OF OVERLAPPING SITES OF SKIN (HCC): ICD-10-CM

## 2024-12-04 PROBLEM — C79.31 METASTASIS TO BRAIN (HCC): Status: ACTIVE | Noted: 2024-12-04

## 2024-12-04 PROCEDURE — 70553 MRI BRAIN STEM W/O & W/DYE: CPT

## 2024-12-04 PROCEDURE — A9576 INJ PROHANCE MULTIPACK: HCPCS | Performed by: STUDENT IN AN ORGANIZED HEALTH CARE EDUCATION/TRAINING PROGRAM

## 2024-12-04 PROCEDURE — 6360000004 HC RX CONTRAST MEDICATION: Performed by: STUDENT IN AN ORGANIZED HEALTH CARE EDUCATION/TRAINING PROGRAM

## 2024-12-04 RX ADMIN — GADOTERIDOL 13 ML: 279.3 INJECTION, SOLUTION INTRAVENOUS at 15:22

## 2024-12-04 ASSESSMENT — ENCOUNTER SYMPTOMS
SHORTNESS OF BREATH: 0
VOMITING: 0
SORE THROAT: 0
ABDOMINAL PAIN: 0
PHOTOPHOBIA: 1
BACK PAIN: 0
COUGH: 0
RHINORRHEA: 0
CHEST TIGHTNESS: 0
DIARRHEA: 0
NAUSEA: 0
CHOKING: 0
CONSTIPATION: 0

## 2024-12-04 ASSESSMENT — PAIN - FUNCTIONAL ASSESSMENT: PAIN_FUNCTIONAL_ASSESSMENT: NONE - DENIES PAIN

## 2024-12-05 ENCOUNTER — TRANSCRIBE ORDERS (OUTPATIENT)
Dept: ADMINISTRATIVE | Age: 89
End: 2024-12-05

## 2024-12-05 ENCOUNTER — APPOINTMENT (OUTPATIENT)
Dept: CT IMAGING | Age: 89
DRG: 054 | End: 2024-12-05
Payer: MEDICARE

## 2024-12-05 DIAGNOSIS — C79.31 METASTASIS TO BRAIN (HCC): Primary | ICD-10-CM

## 2024-12-05 PROBLEM — C43.9 METASTATIC MELANOMA (HCC): Status: ACTIVE | Noted: 2024-12-05

## 2024-12-05 ASSESSMENT — ENCOUNTER SYMPTOMS
ABDOMINAL PAIN: 1
COUGH: 1

## 2024-12-05 ASSESSMENT — PAIN SCALES - GENERAL
PAINLEVEL_OUTOF10: 0

## 2024-12-05 NOTE — PROGRESS NOTES
Latest Reference Range & Units 12/05/24 02:20   Troponin, High Sensitivity 0 - 14 ng/L 65 (H)    ICU provider made aware.    Electronically signed by RUMA ARDON RN on 12/5/2024 at 3:07 AM

## 2024-12-05 NOTE — PLAN OF CARE
Problem: Discharge Planning  Goal: Discharge to home or other facility with appropriate resources  Outcome: Adequate for Discharge  Flowsheets (Taken 12/5/2024 0400 by Jose Arora RN)  Discharge to home or other facility with appropriate resources: Identify barriers to discharge with patient and caregiver     Problem: Safety - Adult  Goal: Free from fall injury  Outcome: Adequate for Discharge     Problem: ABCDS Injury Assessment  Goal: Absence of physical injury  Outcome: Adequate for Discharge     Problem: Neurosensory - Adult  Goal: Achieves stable or improved neurological status  Outcome: Adequate for Discharge  Flowsheets  Taken 12/5/2024 0800 by Merlin Avina RN  Achieves stable or improved neurological status: Assess for and report changes in neurological status  Taken 12/5/2024 0400 by Jose Arora RN  Achieves stable or improved neurological status: Assess for and report changes in neurological status  Goal: Absence of seizures  Outcome: Adequate for Discharge  Flowsheets  Taken 12/5/2024 0800 by Merlin Avina RN  Absence of seizures: Monitor for seizure activity.  If seizure occurs, document type and location of movements and any associated apnea  Taken 12/5/2024 0400 by Jose Arora RN  Absence of seizures: Monitor for seizure activity.  If seizure occurs, document type and location of movements and any associated apnea  Goal: Remains free of injury related to seizures activity  Outcome: Adequate for Discharge  Flowsheets  Taken 12/5/2024 0800 by Merlin Avina RN  Remains free of injury related to seizure activity: Maintain airway, patient safety  and administer oxygen as ordered  Taken 12/5/2024 0400 by Jose Arora RN  Remains free of injury related to seizure activity: Maintain airway, patient safety  and administer oxygen as ordered  Goal: Achieves maximal functionality and self care  Outcome: Adequate for Discharge  Flowsheets  Taken 12/5/2024 0800 by Merlin Avina RN  Achieves

## 2024-12-05 NOTE — CONSULTS
mouth Every Monday, Wednesday, and Friday      ondansetron (ZOFRAN-ODT) 4 MG disintegrating tablet Take 1 tablet by mouth 3 times daily as needed for Nausea or Vomiting 21 tablet 0    vitamin D 25 MCG (1000 UT) CAPS Take 1 capsule by mouth daily      TURMERIC PO Take by mouth      Turmeric 400 MG CAPS Take 200 capsules by mouth daily        Current Facility-Administered Medications   Medication Dose Route Frequency Provider Last Rate Last Admin    dexAMETHasone (DECADRON) injection 4 mg  4 mg IntraVENous Q6H Angelique Tineo APRN - CNP   4 mg at 12/05/24 0803    levETIRAcetam (KEPPRA) injection 500 mg  500 mg IntraVENous Q12H Angelique Tineo APRN - CNP   500 mg at 12/05/24 0803    sodium chloride flush 0.9 % injection 5-40 mL  5-40 mL IntraVENous 2 times per day Peg Mayo MD   10 mL at 12/05/24 0804    sodium chloride flush 0.9 % injection 5-40 mL  5-40 mL IntraVENous PRN Peg Mayo MD        0.9 % sodium chloride infusion   IntraVENous PRN Peg Mayo MD        ondansetron (ZOFRAN-ODT) disintegrating tablet 4 mg  4 mg Oral Q8H PRN Peg Mayo MD        Or    ondansetron (ZOFRAN) injection 4 mg  4 mg IntraVENous Q6H PRN Peg Mayo MD        polyethylene glycol (GLYCOLAX) packet 17 g  17 g Oral Daily PRN Peg Mayo MD        acetaminophen (TYLENOL) tablet 650 mg  650 mg Oral Q6H PRN Peg Mayo MD        Or    acetaminophen (TYLENOL) suppository 650 mg  650 mg Rectal Q6H PRN Peg Mayo MD        pantoprazole (PROTONIX) 40 mg in sodium chloride (PF) 0.9 % 10 mL injection  40 mg IntraVENous Daily Peg Mayo MD   40 mg at 12/05/24 0803      Objective:  BP (!) 149/63   Pulse 55   Temp 97.1 °F (36.2 °C) (Temporal)   Resp 11   Ht 1.676 m (5' 6\")   Wt 59.9 kg (132 lb 0.9 oz)   SpO2 96%   BMI 21.31 kg/m²     Physical Exam:  Patient seen and examined   General: Alert and cooperative in no acute distress.     HENT: atraumatic, neck  supple  Eyes: Optic discs: Not tested  Pulmonary: unlabored respiratory effort  Cardiovascular:  Warm well perfused. Some peripheral edema in all 4 extremities   Gastrointestinal: abdomen soft, NT, ND    Neurologic Exam:  Neurological:  Mental Status: Awake, alert, oriented x 4   Attention: Intact  Language: No aphasia or dysarthria noted  Sensation: Intact to all extremities to light touch  Coordination: Intact    Cranial Nerves:  Cranial Nerves:  II: Visual acuity not tested, denies new visual changes / diplopia  III, IV, VI: PERRL, 3 mm bilaterally, EOMI, no nystagmus noted  V: Facial sensation intact bilaterally to touch  VII: Face symmetric  VIII: Hearing intact bilaterally to spoken voice  IX: Palate movement equal bilaterally  XI: Shoulder shrug equal bilaterally  XII: Tongue midline    Musculoskeletal:   Gait: Not tested   Assist devices: None   Tone: normal   Motor strength: 5/5 throughout, symmetric     Radiological Findings:  CT HEAD WO CONTRAST   Final Result      Mixed attenuation masses, the larger of which is seen in the external capsule on the right with associated mass effect and a smaller lesion in the left hypothalamus, consistent with findings on MRI suspicious for metastatic disease such as melanoma.      Electronically signed by Prabhjot Hoffmann      XR CHEST (2 VW)   Final Result   No acute findings.      Electronically signed by Santiago Welsh            Labs  Recent Labs     12/04/24 2023 12/05/24  0220    136    107   CO2 22 21   BUN 30* 31*   CREATININE 0.9 0.8   GLUCOSE 127* 147*   PHOS  --  3.8   MG  --  2.02     Recent Labs     12/04/24 2023 12/05/24  0219   WBC 6.5 4.9   RBC 4.63 3.74*   INR 1.05  --          Patient Active Problem List    Diagnosis Date Noted    Metastasis to brain (HCC) 12/04/2024    Lung mass 11/08/2024       Assessment:  89 yo female w/ metastatic melanoma and newly diagnosed brain metastases        Plan:  No emergent neurosurgical intervention

## 2024-12-05 NOTE — CONSULTS
NEUROCRITICAL CARE CONSULT NOTE     Patient: Jonna Erickson MRN: [id number]    YOB: 1935  Age: 89 y.o.  Sex: female   Unit: [facility] ICU [place]  Room/Bed: 4522/4522-01 Location: [facility]    Date of Consultation: 12/5/2024  Date of Admission: 12/4/2024  7:45 PM ( LOS: 1 day )  Primary Care Physician: Michael Luna MD   Consult Requested By: Gustavo Haley MD    Reason for Consult: New brain mets    IMPRESSION & RECOMMENDATIONS     IMPRESSION:  89 y.o. 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 2 confirmed brain mets on MRI, with on being centered at the right external capsule and left hypothalamus, which abuts the left optic nerve were noted. Possible third lesion noted within the left parietal lobe, however this could also be stroke. Patient admitted to ICU given sizable right external capsule met and for frequent neuro checks.     RECOMMENDATIONS:   NEURO:  Neurosurgery consulted.   Neurologic Exams Q1H  NIHSS on admission & Qshift  Cerebral Edema  Avoid hypotonic fluids  HOB >30  Avoid IJ unless cleared by NCC team  Decadron 4 mg Q6H   GI prophylaxis: Protonix  POC glucose ACHS    ICU MANAGEMENT  Airway Management  Supplemental O2 to maintain SaO2 > 95%  Aspiration Precautions (HOB elevated, Up for meals, mouth care)  Intubate for respiratory distress, GCS < 8, inability to protect airway  Prefer RSI Protocol for intubation  Titrate ventilator to maintain PaO2 >100 mm Hg  Keep PaCO2 Normalized  Sedatives and analgesics as indicated for mechanical ventilation  Some patients may NOT require sedatives due to mental status  Okay to discontinue sedation / analgesia if patient's mental status does not  require.  Monitor effects of sedation / analgesia on MAP   Sedation Vacations Q4H for neurologic exams  Q1H GCS / cranial nerve checks  Hemodynamic management  SBP <= 160 mmHg  IV Intermittent dose: Labetalol 10 mg  Seizure prophylaxis  Keppra 500 mg BID  DVT Prophylaxis  SCDs bilateral Lower Extremities    General Care Issues  Hold AC and antiplatelet medications.   Glucose: Goal glucose 110-180 mg/dL.    Sodium:  Maintain in normal range (135 - 146 Kallie/L)  Magnesium: Maintain > 1.8 mg/dL.  Heme: Keep Plts >= 100K, Keep INR <= 1.4  Temperature: Goal is normothermia.  Culture for fever > 101.5 F  Nutrition: Place NG if unable to pass SLP swallow evaluation   PT/OT/SLP & PMR consult as indicated      Management and plan discussed with:   Bedside nurse  Dr. Cristiane Tineo, EDUAR - CNP   NEUROCRITICAL CARE  12/5/2024 3:38 AM  [facility]: [facility] NEUROCRITICAL CARE      History of Present Illness     Jonna Erickson is a 89 y.o. y/o female with history significant for  hx of polycystic kidney disease s/p renal transplant (2007) and metastatic melanoma to the LLL lung (confirmed on biopsy 11/19/24) who presents to [facility] with cc of  lightheadedness and dizziness. Patient was recently admitted from 11/8-11/9 with cc of fatigue ad cough, and was found to have a LLL lung mass with possible superimposed PNA. She was also found to have some mild hyponatremia with a sodium of 127. She was treated and discharged. She underwent outpatient biopsy of her LLL lung mass with biopsy reading as metastatic melanoma. She underwent MRI of her brain on December 4th, which resulted showing 2 intra-axial metastases centered at the right external capsule and left hypothalamus abutting the left optic nerve. Also noted, left parietal lobe 6 mm focus of diffusion restriction with differential diagnosis including stroke vs possible third metastasis no well visualized on the other sequences. She was instructed to come into the hospital for

## 2024-12-05 NOTE — H&P
ICU HISTORY AND PHYSICAL       Admit Date:  12/4/2024                            Hospital Day: 2  ICU Day: 2      CC: Abnormal MRI    History obtained from:  the patient and her family    SUBJECTIVE   HPI:    Ms. Jonna Erickson is a 89 y.o. female with a medical hx significant for PKD s/p Renal Transplant 2007, Melanoma of the scalp s/p excision 2012; now with metastases, who presented from home to the ED on 12/4/24 following an MRI which noted lesions in her brain. She was recently admitted to [facility] w/ symptoms concerning for pneumonia but was found to have a lung mass in her left lower lobe. She was discharged on 11/9 with follow up planned as outpatient. In the interim she states she continued to have extreme fatigue, and began losing her appetite. She had a biopsy of the lung mass performed on 11/19. Pathology report states \"Biopsy, left lower lobe mass: Involved with metastatic melanoma.\" MRI for staging was performed on 12/4 and showed at least 2 metastases with signal consistent with melanoma; in her right external capsule, left hypothalamus, and possibly left parietal lobe, with right cerebral mass effect but no significant midline shift. Given these findings she was instructed to present to [facility] ED with concerns for risk of intramural bleed.    She endorses appetite change, fatigue, mild congestion and postnasal drip, photophobia, a mild headache, light-headedness when she stands, and generalized weakness. She denies chills, fever, rhinorrhea, sore throat, visual disturbance, dizziness, facial asymmetry, numbness, and speech difficulty. Review of all other systems was negative.    ED Course:  On arrival to the ED, she was found to have oxygen saturation of 90% and occasionally dipping down to 88%, but otherwise vitally stable. She was started on 2 L O2 by NC.  Labs were significant for BUN 30, Glucose 127, NT Pro-BNP 1,238, Troponin 56  Imaging:  CXR - No acute findings  MRI (Outpatient prior to

## 2024-12-05 NOTE — PROGRESS NOTES
[facility] Medicine Progress Note  V 10.25      Date of Admission: 12/4/2024    Hospital Day: 2      Chief Admission Complaint: Brain mass    Subjective: Patient seen and evaluated at bedside.  Did ongoing generalized malaise and fatigue.  Did not have any other active complaints.  No events overnight.    Presenting Admission History:       89-year-old female with a past medical history of kidney transplant, who was recently discharged from the hospital on 11/9/2024 after being evaluated for new left lower lobe lung mass and superimposed pneumonia as well as SIADH.  Underwent a lung biopsy on 11/19/2024.  Pathology report showed concerns for metastatic melanoma.  Patient underwent an MRI brain on 12/4/2024 which showed at least 2 metastasis consistent with metastatic melanoma.  With concerns for right cerebral mass effect but no midline shift.  She was advised to come to the ED for evaluation of intramural bleed. Patient was admitted to ICU and neurology and hematology oncology was consulted.  Patient reports ongoing generalized weakness and malaise for weeks which is currently stable.  CT head showed mixed attenuation masses similar to MRI findings concerning for metastatic disease such as melanoma.     Assessment/Plan:      Current Principal Problem:  Metastasis to brain (HCC)    Metastatic melanoma, lung mets, brain mets  Jejunal intussusception  Kidney transplant, on immunosuppression, right hydronephrosis     *Hematology oncology does not recommend inpatient chemotherapy.    Per neurosurgery she is a decent candidate for radiosurgery to the brain lesion for disease control, she may be a candidate for targeted medical therapy as well  Surgery is an option on her right sided brain met but as she is seemingly asymptomatic she might be better off with the radiosurgery and not surgical treatment  Per radiation oncology, CT abdomen pelvis and low of PET to complete staging  Dexamethasone 4 mg/day until day of

## 2024-12-05 NOTE — DISCHARGE INSTRUCTIONS
-Take keppra 500 mg every morning and evening     -Take decadron 4 mg every morning     -Please make appointment and follow up with oncology     -Please call and make appointment and follow up with Radiation oncology

## 2024-12-05 NOTE — CONSULTS
Oncology Hematology Care    Consult Note      Requesting Physician:  Gael     CHIEF COMPLAINT:  brain mets       HISTORY OF PRESENT ILLNESS:    Ms. Erickson  is a 89 y.o. female we are seeing in consultation for Metastatic Melanoma. She is followed by Dr. Cheko Man as an outpatient. This has recently been diagnosed and she was undergoing her outpatient workup for this. Brain MRI yesterday revealed evidence of brain mets. She was directed to the ED for further management.     Today she is scheduled for a PET/CT and having this is her utmost concern. She tells me that she would like to pay out of pocket in order for this to be completed while she is here.         Past Medical History:  No past medical history on file.    Past Surgical History:  Past Surgical History:   Procedure Laterality Date    CT NEEDLE BIOPSY LUNG PERCUTANEOUS  11/19/2024    CT NEEDLE BIOPSY LUNG PERCUTANEOUS 11/19/2024 [facility] CT SCAN    KIDNEY TRANSPLANT         Current Medications:  Current Facility-Administered Medications   Medication Dose Route Frequency Provider Last Rate Last Admin    dexAMETHasone (DECADRON) injection 4 mg  4 mg IntraVENous Q6H Angelique Tineo APRN - CNP   4 mg at 12/05/24 0803    levETIRAcetam (KEPPRA) injection 500 mg  500 mg IntraVENous Q12H Angelique Tineo APRN - CNP   500 mg at 12/05/24 0803    nitroGLYCERIN (NITROSTAT) 0.4 MG SL tablet             sodium chloride flush 0.9 % injection 5-40 mL  5-40 mL IntraVENous 2 times per day Peg Mayo MD   10 mL at 12/05/24 0804    sodium chloride flush 0.9 % injection 5-40 mL  5-40 mL IntraVENous PRN Peg Mayo MD        0.9 % sodium chloride infusion   IntraVENous PRN Peg Mayo MD        ondansetron (ZOFRAN-ODT) disintegrating tablet 4 mg  4 mg Oral Q8H PRN Peg Mayo MD        Or    ondansetron (ZOFRAN) injection 4 mg

## 2024-12-05 NOTE — CONSULTS
General Surgery   Resident Consult Note    Reason for Consult: jejunal intussusception     History of Present Illness:   Jonna Erickson is a 89 y.o. female with Hx of kidney transplant and metastatic melanoma. Initially presented here for brain met with edema. Was supposed to be discharged today, but had staging CT for her melanoma. CT showed an incidental finding of jejunal intussusception. General surgery consulted. On interview, pt denies any n/v/d. Denies any current abdominal pain. Tolerating regular diet. Having bowel movements and passing gas. No other medical complaints or injuries verbalized at this time.     Past Medical History:    No past medical history on file.    Past Surgical History:           Procedure Laterality Date    CT NEEDLE BIOPSY LUNG PERCUTANEOUS  11/19/2024    CT NEEDLE BIOPSY LUNG PERCUTANEOUS 11/19/2024 [facility] CT SCAN    KIDNEY TRANSPLANT         Allergies:  Patient has no known allergies.    Medications:   Home Meds  No current facility-administered medications on file prior to encounter.     Current Outpatient Medications on File Prior to Encounter   Medication Sig Dispense Refill    tacrolimus (PROGRAF) 1 MG capsule Take 1 capsule by mouth Every Monday, Wednesday, and Friday      ondansetron (ZOFRAN-ODT) 4 MG disintegrating tablet Take 1 tablet by mouth 3 times daily as needed for Nausea or Vomiting 21 tablet 0    vitamin D 25 MCG (1000 UT) CAPS Take 1 capsule by mouth daily      Turmeric 400 MG CAPS Take 200 capsules by mouth daily      sirolimus (RAPAMUNE) 1 MG tablet Take 1 tablet by mouth daily On Monday, Wed, Friday      vitamin B-12 (CYANOCOBALAMIN) 100 MCG tablet Take 1 tablet by mouth daily         Current Meds  dexAMETHasone (DECADRON) injection 4 mg, Q6H  levETIRAcetam (KEPPRA) injection 500 mg, Q12H  sodium chloride flush 0.9 % injection 5-40 mL, 2 times per day  sodium chloride flush 0.9 % injection 5-40 mL, PRN  0.9 % sodium chloride infusion, PRN  ondansetron (ZOFRAN-ODT)

## 2024-12-05 NOTE — PROGRESS NOTES
4 Eyes Skin Assessment     NAME:  Jonna Erickson  YOB: 1935  MEDICAL RECORD NUMBER:  [id number]    The patient is being assessed for  Admission    I agree that at least one RN has performed a thorough Head to Toe Skin Assessment on the patient. ALL assessment sites listed below have been assessed.      Areas assessed by both nurses:    Head, Face, Ears, Shoulders, Back, Chest, Arms, Elbows, Hands, Sacrum. Buttock, Coccyx, Ischium, and Legs. Feet and Heels      -Redness on lower legs and toes w/ vascular discoloration      -Scattered bruising       - pitting edema in all 4 extremities          Does the Patient have a Wound? No noted wound(s)       Eric Prevention initiated by RN: No  Wound Care Orders initiated by RN: No    Pressure Injury (Stage 3,4, Unstageable, DTI, NWPT, and Complex wounds) if present, place Wound referral order by RN under : No    New Ostomies, if present place, Ostomy referral order under : No     Nurse 1 eSignature: Electronically signed by RUMA ARDON RN on 12/5/24 at 12:48 AM EST    **SHARE this note so that the co-signing nurse can place an eSignature**    Nurse 2 eSignature: Electronically signed by Riley Griggs RN on 12/5/24 at 6:10 AM EST

## 2024-12-05 NOTE — ED PROVIDER NOTES
THE [facility]  EMERGENCY DEPARTMENT ENCOUNTER          ATTENDING PHYSICIAN NOTE       Date of evaluation: 12/4/2024    Chief Complaint     Abnormal Lab (Pt presents to the ED due to an abnormal MRI. From [facility])      History of Present Illness     Jonna Erickson is a 89 y.o. female with a history of renal transplant 2007, metastatic melanoma who presents to the hospital for evaluation of abnormal MRI.  The patient had an MRI done within the past few days noted to have metastatic lesions to his brain with findings concerning for vasogenic edema.  The patient states that she has been feeling lightheaded particular when she tries to stand up and move around for the past few days.  She denies any headache.  She denies any weakness in her extremities, slurring of her speech.  She has not been feeling very fatigued and moving around.    ASSESSMENT / PLAN  (MEDICAL DECISION MAKING)     INITIAL VITALS: BP: (!) 166/75, Temp: 97.7 °F (36.5 °C), Pulse: 63, Respirations: 18, SpO2: 90 %      Jonna Erickson is a 89 y.o. female presenting to the hospital for evaluation of an abnormal MRI.    Is this patient to be included in the SEP-1 core measure? No Exclusion criteria - the patient is NOT to be included for SEP-1 Core Measure due to: Infection is not suspected    Medical Decision Making  Upon arrival patient was noted to have saturations at 90% and occasionally dipping down to 88%.  She does not wear oxygen at baseline.  Patient was placed on 2 L of oxygen at this time.  Consult placed to neurosurgery given the abnormal findings of the MRI.  Patient was found to have mildly elevated cardiac enzymes and an elevated BNP.  She was started on steroids, Keppra and PPI prophylaxis given the high amount of steroids she will be requiring.  Patient and family were updated on the patient's plan and will be admitted to the ICU for neurochecks.  Patient noted to be saturating at 100% on just 2 L of oxygen at this time.    Problems

## 2024-12-05 NOTE — CONSULTS
[facility]    Consult Note      Requesting Physician:  Dr Wheat    CHIEF COMPLAINT:  brain metastasis      HISTORY OF PRESENT ILLNESS:    Ms. Erickson  is a 89 y.o. female we are seeing in consultation for     ICD-10-CM    1. Metastatic melanoma (HCC)  C43.9       2. Hypoxemia  R09.02          88 yo F with hx of kidney transplant on immunosuppression and oligometastatic recurrence of melanoma (BRAF testing pending) to the brain (2 lesions) and left lower lobe (biopsy proven). Hx of scalp melanoma in 2012 s/p resection.    12/28/12 right scalp resection was positive for nodular melanoma, 1 cm, 0.9 mm thickness, negative margins, 0/3 LN+, pT2N0. She was recently diagnosed with LLL biopsy proven recurrence 11/19/24 after 11/8/24 CT chest and CXR for cough revealed 8.0 x 6.1 cm LLL lesion. 12/4/24 MRI Brain showed at least 2 intra-axial metastases (with signal consistent with melanoma) centered at the right external capsule (3.7 x 2.9 cm) and left hypothalamus (0.9 x 0.9 cm) abutting left optic nerve with a Left parietal lobe 6 mm focus of diffusion restriction with differential diagnosis including acute infarction or possible third metastasis.     Today, she denies nausea, vomiting, headaches, seizure, neurologic deficits.  She denies chest pain or shortness of breath.  She denies hemoptysis.    Past Medical History:  No past medical history on file.    Past Surgical History:  Past Surgical History:   Procedure Laterality Date    CT NEEDLE BIOPSY LUNG PERCUTANEOUS  11/19/2024    CT NEEDLE BIOPSY LUNG PERCUTANEOUS 11/19/2024 [facility] CT SCAN    KIDNEY TRANSPLANT         Current Medications:  Current Facility-Administered Medications   Medication Dose Route Frequency Provider Last Rate Last Admin    dexAMETHasone (DECADRON) injection 4 mg  4 mg IntraVENous Q6H Angelique Tineo, APRN - CNP   4 mg  radiosurgery, we will arrange outpatient appointments.    Recommend:   1) CT abdomen pelvis in lieu of PET to complete staging vs outpatient PET per patient and provider preference  2) dex 4 mg daily if tolerated until day of GK (either 12/9 or 12/16 start)  3) Gamma Knife radiosurgery, 30 Gy/5 fxs, to the 2 intracranial metastases for improved local control and prevention of neurologic compromise. Would hold sirolimus, if possible, shortly before and after RT (will discuss with Dr Kowalski at )  4) FU outpatient with med onc to discuss systemic therapy after BRAF testing is available (if BRAF+, will likely get TKI, otherwise plan depends on interaction of agents with immunosuppressive meds)  3) Observation of relatively asymptomatic LLL lesion at this time as we await BRAF testing; if excellent systemic therapy response, this oligometastasis could be consolidated after restaging in 3-4 cycles with SBRT or SBRT-like IMRT    Thank you for asking me to see the patient.     A total of 75 minutes was spent on today's patient encounter.  If applicable, non-patient-facing activities:     (  X )   Preparing to see the patient and reviewing records     (  X )   Individual interpretation of results      (  X )   Discussion or coordination of care with other health care professionals - neurosurgery, hospitalist     (  X )   Ordering of unique tests, medications, or procedures     (  X )   Documentation within the EHR     Moises Hammond MD, RENEA  Radiation Oncology  [facility]  Email: [email or web address]   Please Contact Through [employer]

## 2024-12-05 NOTE — PROGRESS NOTES
Speech Language Pathology  Facility/Department:[facility] ICU  Dysphagia Evaluation/treat    Name: Jonna Erickson  : 1935  MRN: [id number]                                                     Patient Diagnosis(es):   Patient Active Problem List    Diagnosis Date Noted    Metastasis to brain (HCC) 2024    Lung mass 2024     No past medical history on file.  Past Surgical History:   Procedure Laterality Date    CT NEEDLE BIOPSY LUNG PERCUTANEOUS  2024    CT NEEDLE BIOPSY LUNG PERCUTANEOUS 2024 [facility] CT SCAN    KIDNEY TRANSPLANT       Reason for Referral:  Jonna Erickson  was referred for a Speech Therapy evaluation to assess swallow function and/or communication.    History of Present Illness  Per MD notes:  \" Ms. Jonna Erickson is a 89 y.o. female with a medical hx significant for PKD s/p Renal Transplant , Melanoma of the scalp s/p excision ; now with metastases, who presented from home to the ED on 24 following an MRI which noted lesions in her brain. She was recently admitted to [facility] w/ symptoms concerning for pneumonia but was found to have a lung mass in her left lower lobe. She was discharged on  with follow up planned as outpatient. In the interim she states she continued to have extreme fatigue, and began losing her appetite. She had a biopsy of the lung mass performed on . Pathology report states \"Biopsy, left lower lobe mass: Involved with metastatic melanoma.\" MRI for staging was performed on  and showed at least 2 metastases with signal consistent with melanoma; in her right external capsule, left hypothalamus, and possibly left parietal lobe, with right cerebral mass effect but no significant midline shift. Given these findings she was instructed to present to [facility] ED with concerns for risk of intramural bleed.  She endorses appetite change, fatigue, mild congestion and postnasal drip, photophobia, a mild headache, light-headedness when she stands, and  co-morbidities    Treatment:  Dysphagia Goals:  The pt will be seen  1-2 x to address the following goals:   Goal 1: Patient will tolerate least restrictive diet with adequate oral prep and without overt signs of aspiration or associated decline in respiratory status.  Goal 2: Patient/caregiver will demonstrate understanding of dysphagia recommendations/concerns.  12/5: .Educated pt to purpose of visit, s/s of aspiration, concern if aspiration occurs and rationale for diet recommendation/strategies to reduce risk for aspiration.  Educated to recommendation for MBS study and rationale.  Pt declines MBS at this time but will monitor need and notify staff if indicated.  Pt stated comprehension   Cont goal    Education  As above    Pt goal: to eat  Pt discharge goal: get PEt scan    Total treatment time: 25 eval/tx    Plan: Continue per POC  Recommended Diet: regular diet/thin liquids -if any s/s of aspiration emerge, or there is respiratory decline,  make NPO until further evaluated by SLP    Medication administration: whole with water - if difficulty emerges, place in puree    Strategies:   Upright all meals  Small sips  Discharge Recommendation: TBD  Discussed with RNMerlin  Needs met prior to leaving room, call light within reach    STEFANI CAMPO M.S./CCC-SLP #[id number]  Pg. # [phone]  This document will serve as a dc summary if pt dc prior to next visit

## 2024-12-05 NOTE — PROGRESS NOTES
12/04/24 2023   PROBNP 1,238*     Recent Labs     12/04/24 2023   INR 1.05     No results for input(s): \"SEDRATE\" in the last 72 hours.  No results for input(s): \"CRP\" in the last 72 hours.  No results for input(s): \"LACTATE\" in the last 72 hours.      UA/micro: No results for input(s): \"NITRITE\", \"COLORU\", \"PHUR\", \"LABCAST\", \"WBCUA\", \"RBCUA\", \"MUCUS\", \"TRICHOMONAS\", \"YEAST\", \"BACTERIA\", \"CLARITYU\", \"SPECGRAV\", \"LEUKOCYTESUR\", \"UROBILINOGEN\", \"BILIRUBINUR\", \"BLOODU\", \"GLUCOSEU\", \"AMORPHOUS\" in the last 72 hours.    Invalid input(s): \"KETONEU\"    Micro:   Results       ** No results found for the last 336 hours. **             RADIOLOGY:  XR CHEST (2 VW)   Final Result   No acute findings.      Electronically signed by Santiago Welsh          EKG:   NSR      ASSESSMENT & PLAN   Ms. Jonna Erickson is a 89 y.o. female with a medical hx significant for PKD s/p Renal Transplant 2007, Melanoma of the scalp s/p excision 2012; now with metastases, who presented from home to the ED on 12/4/24 following an MRI which noted lesions in her brain     Metastatic Melanoma w/ Mets to Lung & Brain  Pt was recently admitted to [facility] w/ symptoms concerning for pneumonia but was found to have a lung mass in her left lower lobe. She was discharged on 11/9 with follow up planned as outpatient. In the interim she states she continued to have extreme fatigue, and began losing her appetite. She had a biopsy of the lung mass performed on 11/19. Pathology report states \"Biopsy, left lower lobe mass: Involved with metastatic melanoma.\" MRI for staging was performed on 12/4 and showed at least 2 metastases with signal consistent with melanoma; in her right external capsule, left hypothalamus, and possibly left parietal lobe, with no significant midline shift.  - C/s NCC, NSGY  - C/s Oncology  - Decadron 4 mg IV q6h  - Keppra 500 mg IV q12h  - NPO  - Neurochecks q1h  - NIHSS qShift  - PT/OT  - SLP     Pulmonary  Saturating well on 1L O2 by NC      Cardiovascular  BP and HR stable    GI  Diet: Diet NPO  GI ppx: Protonix 40 mg IV qd     Renal      PKD s/p Renal Transplant  On Sirolimus and Tacrolimus 1 mg each PO MWF  - Continue from Friday if appropriate    Heme  - Hb dropped from 13.2 > 10.5 since admission.  No signs of active bleeding. Patient HD stable  - Continue to monitor        Glycemic goal:  140-180 mg/dL  Diet:  Diet NPO  GI PPx:  Protonix 40 mg IV qd  Bowel Regimen: N/A  DVT PPx: SCDs      Code Status:  Full Code  Disposition:   Prior to admission:  From home  Current:  ICU  At discharge:  JOSELIN Marsh MD, PGY-1  Internal Medicine Resident  Contact via [employer]     Pulmonary & Critical Care    Patient seen and examined. I agree with Dr. Marsh's history, physical, lab findings, assessment and plan.    Shaylee has a history of recently diagnosed recurrent melanoma based on a lung biopsy.  She had a staging MRI that revealed the following:    IMPRESSION:     1. At least 2 intra-axial metastases (with signal consistent with melanoma) centered at the right external capsule (3.7 x 2.9 cm) and left hypothalamus (0.9 x 0.9 cm) abutting left optic nerve.  2. Left parietal lobe 6 mm focus of diffusion restriction with differential diagnosis including acute infarction or possible third metastasis not well visualized on other pulse sequences.  3. Right cerebral mass effect causing mild effacement of the right lateral ventricle, but no significant midline shift.  4. Mild atrophy and mild chronic small vessel ischemia    She was referred to [facility] emergency room where she was admitted to the ICU, had neurochecks every hour, and started on Keppra and Decadron.  Follow-up CT head approximately 16 hours later showed stability.  She does not have a severe headache nor does she have any nausea, vomiting, vision changes, ataxia, or focal weakness.    Initial plan was to discharge patient around noon so she can have her staging PET scan at 2

## 2024-12-05 NOTE — PLAN OF CARE
Problem: Discharge Planning  Goal: Discharge to home or other facility with appropriate resources  Outcome: Progressing  Flowsheets  Taken 12/5/2024 0049  Discharge to home or other facility with appropriate resources: Identify barriers to discharge with patient and caregiver  Taken 12/5/2024 0000  Discharge to home or other facility with appropriate resources: Identify barriers to discharge with patient and caregiver     Problem: Safety - Adult  Goal: Free from fall injury  Outcome: Progressing  Flowsheets (Taken 12/5/2024 0049)  Free From Fall Injury: Instruct family/caregiver on patient safety     Problem: ABCDS Injury Assessment  Goal: Absence of physical injury  Outcome: Progressing  Flowsheets (Taken 12/5/2024 0049)  Absence of Physical Injury: Implement safety measures based on patient assessment     Problem: Neurosensory - Adult  Goal: Achieves stable or improved neurological status  Outcome: Progressing  Flowsheets (Taken 12/5/2024 0049)  Achieves stable or improved neurological status: Assess for and report changes in neurological status  Goal: Absence of seizures  Outcome: Progressing  Flowsheets (Taken 12/5/2024 0049)  Absence of seizures: Monitor for seizure activity.  If seizure occurs, document type and location of movements and any associated apnea  Goal: Remains free of injury related to seizures activity  Outcome: Progressing  Flowsheets (Taken 12/5/2024 0049)  Remains free of injury related to seizure activity: Maintain airway, patient safety  and administer oxygen as ordered  Goal: Achieves maximal functionality and self care  Outcome: Progressing  Flowsheets (Taken 12/5/2024 0049)  Achieves maximal functionality and self care: Monitor swallowing and airway patency with patient fatigue and changes in neurological status

## 2024-12-05 NOTE — PROGRESS NOTES
Patient was seen today for Gamma Knife consult with Dr. Hammond.  Discussed Gamma Knife in depth with patient and daughter, Negra. Manish also discussed the procedure with them and had them sign consents. Patient was received Dr. Carrasquillo's biography.    Patient's allergies, medications, and medical and surgery history were reviewed.  Also reviewed pre-procedure instructions and provided patient with a written copy of the same.      We discussed the patient's date of procedure will be on 12/16    All patient's questions were answered.     Encouraged to call with any further questions or concerns.    Alison Metz RN

## 2024-12-05 NOTE — PLAN OF CARE
Attending Supervising Physician’s Attestation Statement  I saw and evaluated the patient.  I discussed the findings and plans with resident physician and agree as documented in her note except for as follows    Briefly, 89-year-old female with a past medical history of kidney transplant, who was recently discharged from the hospital on 11/9/2024 after being evaluated for new left lower lobe lung mass and superimposed pneumonia as well as SIADH.  Underwent a lung biopsy on 11/19/2024.  Pathology report showed concerns for metastatic melanoma.  Patient underwent an MRI brain on 12/4/2024 which showed at least 2 metastasis consistent with metastatic melanoma.  With concerns for right cerebral mass effect but no midline shift.  She was advised to come to the ED for evaluation of intramural bleed. Patient was admitted to ICU and neurology and hematology oncology was consulted.  Patient reports ongoing generalized weakness and malaise for weeks which is currently stable.  CT head showed mixed attenuation masses similar to MRI findings concerning for metastatic disease such as melanoma.    Vitals:    12/05/24 0500 12/05/24 0600 12/05/24 0700 12/05/24 0800   BP: 138/61 (!) 142/71 126/63 132/66   Pulse: 62 62 63 57   Resp: 16 18 26 14   Temp:    97.1 °F (36.2 °C)   TempSrc:    Temporal   SpO2: 95% 94%  96%   Weight:       Height:       General: No distress, alert and oriented x3,  Cardiac: S1 plus S2 regular rate and rhythm, no murmurs rubs or gallops  Respiratory: No wheeze or crackles appreciated, equal air entry bilaterally  GI/: Abdomen soft, nontender, bowel sounds audible  Skin: No rashes or ulcers present  MSK: Peripheral pulses intact    Assessment and plan:  Metastatic melanoma, lung mets, brain mets    *Hematology oncology does not recommend inpatient chemotherapy.    Radiation oncology was consulted with plans for initiation of outpatient radiation.  Seizure prophylaxis with Keppra 500 mg twice

## 2024-12-05 NOTE — DISCHARGE INSTR - COC
Continuity of Care Form    Patient Name: Jonna Erickson   :  1935  MRN:  [id number]    Admit date:  2024  Discharge date:  ***    Code Status Order: Full Code   Advance Directives:   Advance Care Flowsheet Documentation             Admitting Physician:  Gustavo Haley MD  PCP: Michael Luna MD    Discharging Nurse: ***  Discharging Hospital Unit/Room#: 4522/4522-01  Discharging Unit Phone Number: ***    Emergency Contact:   Extended Emergency Contact Information  Primary Emergency Contact: Hamzah Erickson  Mobile Phone: [phone]  Relation: Spouse  Secondary Emergency Contact: Graham Erickson  Mobile Phone: [phone]  Relation: Child    Past Surgical History:  Past Surgical History:   Procedure Laterality Date    CT NEEDLE BIOPSY LUNG PERCUTANEOUS  2024    CT NEEDLE BIOPSY LUNG PERCUTANEOUS 2024 [facility] CT SCAN    KIDNEY TRANSPLANT         Immunization History:   Immunization History   Administered Date(s) Administered    COVID-19, MODERNA Bivalent, (age 12y+), IM, 50 mcg/0.5 mL 2022    COVID-19, PFIZER PURPLE top, DILUTE for use, (age 12 y+), 30mcg/0.3mL 2021, 2021, 2021       Active Problems:  Patient Active Problem List   Diagnosis Code    Lung mass R91.8    Metastasis to brain (HCC) C79.31    Metastatic melanoma (HCC) C43.9       Isolation/Infection:   Isolation            No Isolation          Patient Infection Status       None to display                     Nurse Assessment:  Last Vital Signs: BP (!) 154/72   Pulse 65   Temp 97.2 °F (36.2 °C) (Temporal)   Resp 14   Ht 1.676 m (5' 6\")   Wt 59.9 kg (132 lb 0.9 oz)   SpO2 96%   BMI 21.31 kg/m²     Last documented pain score (0-10 scale): Pain Level: 0  Last Weight:   Wt Readings from Last 1 Encounters:   24 59.9 kg (132 lb 0.9 oz)     Mental Status:  {IP PT MENTAL STATUS:}    IV Access:  { DENY IV ACCESS:[id number]}    Nursing Mobility/ADLs:  Walking   {[facility] DME ADLs:[id number]}  Transfer  {[facility] DME

## 2024-12-05 NOTE — PROGRESS NOTES
Admission: Patient received to room 4522 from ED.   Patient admitted with Dx Mets to brain.    Patient A&Ox 4 upon arrival.   Tele monitor applied, rate and rhythm verified with monitor reader.  VSS.   Patient oriented to room, staff, and call system.   Educated on fall protocol and hourly rounding.   Assessment as documented.   Admission navigator complete.   Bed locked in low position.   Patient informed to utilize call light with any needs. Pt verbalized understanding.   Call light within reach.   Hourly rounding in place.      Electronically signed by RUMA ARDON RN on 12/5/2024 at 12:49 AM

## 2024-12-05 NOTE — CARE COORDINATION
12/05/24 0958   Readmission Assessment   Number of Days since last admission? 8-30 days   Previous Disposition Home with Family   Who is being Interviewed Patient   What was the patient's/caregiver's perception as to why they think they needed to return back to the hospital? Other (Comment)  (abnormal MRI)   Did you visit your Primary Care Physician after you left the hospital, before you returned this time? No   Why weren't you able to visit your PCP? Did not have an appointment   Did you see a specialist, such as Cardiac, Pulmonary, Orthopedic Physician, etc. after you left the hospital? Yes   Who advised the patient to return to the hospital? Physician   Does the patient report anything that got in the way of taking their medications? No   In our efforts to provide the best possible care to you and others like you, can you think of anything that we could have done to help you after you left the hospital the first time, so that you might not have needed to return so soon? Other (Comment)  (nothing)       Case Management Assessment  Initial Evaluation    Date/Time of Evaluation: 12/5/2024 9:59 AM  Assessment Completed by: BRYSON NI    If patient is discharged prior to next notation, then this note serves as note for discharge by case management.    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 No    Chart Reviewed: Yes      History Provided by: Patient  Patient Orientation: Alert and Oriented    Patient Cognition: Alert    Hospitalization in the last 30 days (Readmission):  Yes    If yes, Readmission Assessment in  Navigator will be  has a walker and shower chair. She is an active  and states her spouse will be able to provide transportation at discharge. She has a PCP that she is active with and denies issues with obtaining medications.    Current discharge plan is likely home with no CM needs.  Therapy evals are pending.  On IV steroids and Keppra. Scans pending.  NPO.    UPDATE: 1510 Family would like [facility]. No preference on company. [facility] can accept.    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 Freedom of choice list with basic dialogue that supports the patient's individualized plan of care/goals and shares the quality data associated with the providers was provided to: Patient   Patient Representative Name:       The Patient and/or Patient Representative Agree with the Discharge Plan? Yes    BRYSON NI  Case Management Department  Ph: [phone] Fax:

## 2024-12-05 NOTE — PROGRESS NOTES
Physical Therapy  Facility/Department: [facility] ICU  Physical Therapy Initial Assessment    Name: Jonna Erickson  : 1935  MRN: [id number]  Date of Service: 2024    Discharge Recommendations:  24 hour supervision or assist, Home with Home health PT   PT Equipment Recommendations  Equipment Needed: No    Assessment  Assessment: Pt from home with metastasis to brain.  Pt drowsy throughout therapy, needing CGA-Min A for safe transfers and gait.  Pt demonstrates decreased balance, therefore recommend continued use of rolling walker and hands-on assist with mobility at home.  Had lengthy discussion with pt/family about home safety.  Daughter verbalized understanding.  Plan is to return home with assist from family.  Recommend home PT.  Treatment Diagnosis: Decreased gait associated wtih brain mets.  Decision Making: Medium Complexity  Requires PT Follow-Up: Yes    Plan  General Plan:  (2-5)  Current Treatment Recommendations: Transfer training, Functional mobility training, Strengthening, Gait training, Safety education & training, Patient/Caregiver education & training, Stair training    Safety Devices  Type of Devices: Call light within reach, Chair alarm in place, Left in chair, Nurse notified (family in room)    Restrictions  Other Position/Activity Restrictions: Up with assist     Subjective  Chart Reviewed: Yes  Additional Pertinent Hx: Pt to ED  for evaluation of abnormal MRI.  PMH significant for PKD s/p Renal Transplant , Melanoma of the scalp s/p excision ; now with metastases.  MRI noted lesions in the brain. Recently admitted to [facility] w/ symptoms concerning for pneumonia, but was found to have a lung mass and d/c'd home .  Pt admitted to ICU.  Neurosurgery consult pending.    Diagnosis: Brain mets    Subjective  Subjective: Pt found supine in bed.  Pt groggy with slow speech.  Pt pleasant and agreeable for PT.  Pt denies pain.    Social/Functional History  Lives With: Spouse  Type of Home:

## 2024-12-05 NOTE — PROGRESS NOTES
Occupational Therapy  Facility/Department: [facility] ICU  Occupational Therapy Initial Assessment and Treatment Note     Name: Jonna Erickson  : 1935  MRN: [id number]  Date of Service: 2024    Discharge Recommendations:  24 hour supervision or assist, Home with Home health OT  OT Equipment Recommendations  Equipment Needed: No       Patient Diagnosis(es): The primary encounter diagnosis was Metastatic melanoma (HCC). A diagnosis of Hypoxemia was also pertinent to this visit.  Past Medical History:  has no past medical history on file.  Past Surgical History:  has a past surgical history that includes Kidney transplant and CT NEEDLE BIOPSY LUNG PERCUTANEOUS (2024).    Treatment Diagnosis: impaired LE ADLs/ functional mobility 2/2 Brain mets / medication      Assessment  Performance deficits / Impairments: Decreased functional mobility ;Decreased ADL status  Assessment: Pt from home with spouse.  Pt with decline in physical / mentation in last 1-2 weeks. Pt with new dx of Brain Mets. Awaiting further workup as outpt.  Pt needing Min/ CGA for functional mobility with RW and Min / Mod A with LE ADLs.  Pt limited by fatigue and medication. Recommend home with 24hr assist and HHOT.  Pt has all indicated DME at home.  Will follow as inpt. Pt family reports d/c this date.  Treatment Diagnosis: impaired LE ADLs/ functional mobility 2/2 Brain mets / medication  Decision Making: Medium Complexity  REQUIRES OT FOLLOW-UP: Yes  Activity Tolerance  Activity Tolerance: Patient Tolerated treatment well;Patient limited by fatigue  Activity Tolerance Comments: No DAN noted. No dizziness after initial mobility     Plan  Occupational Therapy Plan  Times Per Week: 2-5x  Times Per Day: Once a day  Current Treatment Recommendations: Functional mobility training, Endurance training, Safety education & training, Patient/Caregiver education & training, Self-Care / ADL    Restrictions  Position Activity Restriction  Other

## 2024-12-06 PROBLEM — K56.1: Status: ACTIVE | Noted: 2024-12-06

## 2024-12-06 ASSESSMENT — PAIN SCALES - GENERAL
PAINLEVEL_OUTOF10: 0

## 2024-12-06 NOTE — PROGRESS NOTES
Pt resting in room. VSS. Discharge order placed, waiting on prescriptions. LDAs removed. AVS printed and reviewed with pt, pt , and daughter.

## 2024-12-06 NOTE — PROGRESS NOTES
Occupational Therapy Attempt    Chart reviewed. Attempted to see pt per OT POC. Pt declining OT services at this time, reporting fatigue due to recently completing toileting and transferring to chair. Pt and family reporting plan for discharge today. Will follow up as able.     Margie Stratton, OTR/L

## 2024-12-06 NOTE — CARE COORDINATION
Oxygen Company: Not Applicable  Portable tank available for discharge?: Not Indicated    Dialysis:  Dialysis patient: No    Dialysis Center:  Not Applicable    Hospice Services:  Location: Not Applicable  Agency: Not Applicable    Consents signed: Not Indicated    Referrals made at DISCHARGE for outpatient continued care:  Not Applicable    Additional CM Notes: Discharge order noted. Patient to discharge home with [facility]. Family at bedside to transport home.    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 Yes    Care Transitions patient: No    Thank you  Shabana Noriega RN, BSN, CM  ICU   [phone]

## 2024-12-06 NOTE — PROGRESS NOTES
General Surgery   Daily Progress Note  Patient: Jonna Erickson      CC: jejunal intussusception    SUBJECTIVE:   No acute events overnight. Patient states she does not have abdominal pain but she has discomfort. Denies bloating, nausea, or vomiting. States that she is passing flatus and she had a bowel movements yesterday afternoon.     ROS:   A 14 point review of systems was conducted, significant findings as noted above. All other systems negative.    OBJECTIVE:    PHYSICAL EXAM:    Vitals:    12/06/24 0300 12/06/24 0400 12/06/24 0500 12/06/24 0600   BP:  (!) 120/58     Pulse: 53 50 54 (!) 48   Resp:  16     Temp:  97.5 °F (36.4 °C)     TempSrc:  Temporal     SpO2:  96%     Weight:  60.1 kg (132 lb 7.9 oz)     Height:           General appearance: alert, no acute distress. Generalized anasarca  Eyes: PERRL, no scleral icterus  Neck: trachea midline, no JVD  Chest/Lungs: normal effort, no adventitious breathing, no accessory muscle use, on RA  Cardiovascular: RRR  Abdomen: soft, minimally tender to LUQ, non-distended, no guarding, no rigidity  Skin: no obvious wounds or lesions  Extremities: upper extremities wrapped  Neuro: A&Ox3    ASSESSMENT & PLAN:   This is a 89 y.o. female with Hx of Hx of kidney transplant and metastatic melanoma. CT A/P positive for Intussusception in the proximal jejunum.       - No acute general surgery intervention at this time   - Repeat CT scan with resolved initial intussusception with new area of intussusception  - Given patient has a benign exam and is passing flatus and had a bowel movement yesterday, will monitor for continued bowel movement  - Okay to have a diet from surgical stand point  -  management of hydronephrosis per primary team       Clarence Cortez DO  PGY-1, General Surgery Resident  12/06/24 7:23 AM  [employer]  [phone]

## 2024-12-06 NOTE — CONSULTS
Urology Attending Consult Note      Reason for Consultation: Hydronephrotic transplant kidney     History: 88 yo F with history of metastatic melanoma and polycystic kidney disease sp renal transplant with UC in 2007 admitted following brain lesions on MRI. CT abd/pelvis was done showing hydronephrosis of her transplanted kidney. Her Cr has been normal since admission. She is voiding without any issue. We were consulted for further recs.     Family History, Social History, Review of Systems:  Reviewed and agreed to as per chart    Vitals:  BP (!) 133/56   Pulse 59   Temp 98 °F (36.7 °C) (Axillary)   Resp 16   Ht 1.676 m (5' 6\")   Wt 60.1 kg (132 lb 7.9 oz)   SpO2 97%   BMI 21.39 kg/m²   Temp  Av.5 °F (36.4 °C)  Min: 96.9 °F (36.1 °C)  Max: 98 °F (36.7 °C)    Intake/Output Summary (Last 24 hours) at 2024 1046  Last data filed at 2024 1000  Gross per 24 hour   Intake 300 ml   Output 400 ml   Net -100 ml         Physical:  Well developed, well nourished in no acute distress  Mood indicates no abnormalities. Pt doesn’t appear depressed  Orientated to time and place  Neck is supple, trachea is midline  Respiratory effort is normal  Cardiovascular show no extremity swelling          Labs:  WBC:    Lab Results   Component Value Date/Time    WBC 5.7 2024 04:43 AM     Hemoglobin/Hematocrit:    Lab Results   Component Value Date/Time    HGB 10.3 2024 04:43 AM    HCT 32.3 2024 04:43 AM     BMP:    Lab Results   Component Value Date/Time     2024 04:43 AM    K 4.0 2024 04:43 AM    K 4.2 2024 08:23 PM     2024 04:43 AM    CO2 22 2024 04:43 AM    BUN 32 2024 04:43 AM    CREATININE 0.8 2024 04:43 AM    CALCIUM 8.0 2024 04:43 AM    LABGLOM 71 2024 04:43 AM     PT/INR:    Lab Results   Component Value Date/Time    PROTIME 14.0 2024 08:23 PM    INR 1.05 2024 08:23 PM     PTT:    Lab Results   Component Value

## 2024-12-06 NOTE — PROGRESS NOTES
Speech Language Pathology  Facility/Department:[facility] ICU  Dysphagia treat    Name: Jonna Erickson  : 1935  MRN: [id number]                                                     Patient Diagnosis(es):   Patient Active Problem List    Diagnosis Date Noted    Intestinal intussusception (HCC) 2024    Metastatic melanoma (HCC) 2024    Metastasis to brain (HCC) 2024    Lung mass 2024     No past medical history on file.  Past Surgical History:   Procedure Laterality Date    CT NEEDLE BIOPSY LUNG PERCUTANEOUS  2024    CT NEEDLE BIOPSY LUNG PERCUTANEOUS 2024 [facility] CT SCAN    KIDNEY TRANSPLANT       History of Present Illness  Per MD notes:  \" Ms. Jonna Erickson is a 89 y.o. female with a medical hx significant for PKD s/p Renal Transplant , Melanoma of the scalp s/p excision ; now with metastases, who presented from home to the ED on 24 following an MRI which noted lesions in her brain. She was recently admitted to [facility] w/ symptoms concerning for pneumonia but was found to have a lung mass in her left lower lobe. She was discharged on  with follow up planned as outpatient. In the interim she states she continued to have extreme fatigue, and began losing her appetite. She had a biopsy of the lung mass performed on . Pathology report states \"Biopsy, left lower lobe mass: Involved with metastatic melanoma.\" MRI for staging was performed on  and showed at least 2 metastases with signal consistent with melanoma; in her right external capsule, left hypothalamus, and possibly left parietal lobe, with right cerebral mass effect but no significant midline shift. Given these findings she was instructed to present to [facility] ED with concerns for risk of intramural bleed.  She endorses appetite change, fatigue, mild congestion and postnasal drip, photophobia, a mild headache, light-headedness when she stands, and generalized weakness. She denies chills, fever, rhinorrhea,  regular diet/thin liquids -if any s/s of aspiration emerge, or there is respiratory decline,  make NPO until further evaluated by SLP    Medication administration: whole with water - if difficulty emerges, place in puree    Strategies:   Upright all meals  Small sips    Discharge Recommendation: TBD  Discussed with RNBrittny  Needs met prior to leaving room, call light within reach    STEFANI CAMPO M.S./CCC-SLP #[id number]  Pg. # [phone]  This document will serve as a dc summary if pt dc prior to next visit

## 2024-12-06 NOTE — DISCHARGE SUMMARY
V2.0  Discharge Summary    Name:  Jonna Erickson /Age/Sex: 1935 (89 y.o. female)   Admit Date: 2024  Discharge Date: 24    MRN & CSN:  [id number] & [id number] Encounter Date and Time 24 1:20 PM EST    Attending:  Ismael Wheat MD Discharging Provider: Ismael Wheat MD       Hospital Course:     Brief HPI: 89-year-old female with a past medical history of kidney transplant, who was recently discharged from the hospital on 2024 after being evaluated for new left lower lobe lung mass and superimposed pneumonia as well as SIADH. Underwent a lung biopsy on 2024. Pathology report showed concerns for metastatic melanoma. Patient underwent an MRI brain on 2024 which showed at least 2 metastasis consistent with metastatic melanoma. With concerns for right cerebral mass effect but no midline shift. She was advised to come to the ED for evaluation of intramural bleed. Patient was admitted to ICU and neurology and hematology oncology was consulted. Patient reports ongoing generalized weakness and malaise for weeks which is currently stable. CT head showed mixed attenuation masses similar to MRI findings concerning for metastatic disease such as melanoma.     Brief Problem Based Course:   Metastatic melanoma, lung mets, brain mets  Jejunal intussusception  Kidney transplant, on immunosuppression, right hydronephrosis    Plan:  *Hematology oncology does not recommend inpatient chemotherapy.    Per neurosurgery she is a decent candidate for radiosurgery to the brain lesion for disease control, she may be a candidate for targeted medical therapy as well  Surgery is an option on her right sided brain met but as she is seemingly asymptomatic she might be better off with the radiosurgery and not surgical treatment  Per radiation oncology, CT abdomen pelvis instead of PET CT to complete staging  Dexamethasone 4 mg/day until day of radiation therapy to be started on 2024 or 2024  PPI  with associated edema, mass effect, sulcus effacement and effacement of the anterior horn of the right lateral ventricle. Additional 9 mm high attenuation mass seen within the left hypothalamus consistent with recent MRI. There is a minimal amount of leftward midline shift. Also scattered chronic white matter ischemic changes. The mastoid air cells are clear. Paranasal sinuses are clear.  Calvarium is without acute changes.     Mixed attenuation masses, the larger of which is seen in the external capsule on the right with associated mass effect and a smaller lesion in the left hypothalamus, consistent with findings on MRI suspicious for metastatic disease such as melanoma. Electronically signed by Prabhjot Hoffmann    XR CHEST (2 VW)    Result Date: 12/4/2024  Examination: Chest x-ray 2 view Exam time: 12/4/2024 20:33 EST Clinical history: Shortness of Breath Comparison: 19 November 2024 Technique: Frontal and lateral views of the chest were obtained. Findings:  Mass obscuring the left diaphragm again visible. No acute infiltrate. Small bilateral pleural effusions. No pneumothorax. Senescent findings dorsal spine cardiovascular structures. Stable heart size no vascular congestion     No acute findings. Electronically signed by Santiago Welsh    MRI BRAIN W WO CONTRAST    Result Date: 12/4/2024  EXAM: MRI BRAIN W WO CONTRAST INDICATION: C43.8: Malignant melanoma of overlapping sites of skin (HCC); COMPARISON: None TECHNIQUE: Multiplanar, multisequence MR imaging of the head obtained without and with contrast. IV contrast: ProHance FINDINGS: There is a right cerebral intra-axial mass 3.7 x 2.9 cm centered along the anterior aspect of the external capsule with predominantly hyperintense T1/T2 precontrast signal (also hyperintense on susceptibility weighted series 8) and containing a mild amount of intralesional susceptibility compatible with only a component of intratumoral hemorrhage. There is a thin margin of

## 2024-12-06 NOTE — PLAN OF CARE
Problem: Neurosensory - Adult  Goal: Achieves stable or improved neurological status  12/6/2024 1227 by Brittny Garsia RN  Outcome: Progressing  Flowsheets  Taken 12/6/2024 0800 by Brittny Garsia RN  Achieves stable or improved neurological status:   Assess for and report changes in neurological status   Initiate measures to prevent increased intracranial pressure    Goal: Absence of seizures  12/6/2024 1227 by Brittny Garsia RN  Outcome: Progressing  Flowsheets  Taken 12/6/2024 0800 by Brittny Garsia RN  Absence of seizures:   Monitor for seizure activity.  If seizure occurs, document type and location of movements and any associated apnea   If seizure occurs, turn head to side and suction secretions as needed  Goal: Remains free of injury related to seizures activity  12/6/2024 1227 by Brittny Garsia RN  Outcome: Progressing  Flowsheets  Taken 12/6/2024 0800 by Brittny Garsia RN  Remains free of injury related to seizure activity:   Maintain airway, patient safety  and administer oxygen as ordered   Monitor patient for seizure activity, document and report duration and description of seizure to Licensed Independent Practitioner    Goal: Achieves maximal functionality and self care  12/6/2024 1227 by Brittny Garsia RN  Outcome: Progressing  Flowsheets  Taken 12/6/2024 0800 by Brittny Garsia RN  Achieves maximal functionality and self care:   Monitor swallowing and airway patency with patient fatigue and changes in neurological status   Encourage and assist patient to increase activity and self care with guidance from physical therapy/occupational therapy

## 2024-12-06 NOTE — PROGRESS NOTES
Hospitalist messaged via [employer] by this RN about discharge orders.     RN Message:   per [facility] pt was supposed to discharge yesterday, but could not get PET scan outpatient d/t insurance issues, so [facility] deciseded to to inpatient CT which got done last night. is pt okay for discharge?    Dr. Wheat  12/6/24 8:42 AM  I think surgery is evaluating for intussusception and waiting to see if she has bm  12/6/24 8:42 AM  Also urology consult for hydrobephrosis    RN Message  12/6/24 8:44 AM   Surgery saw pt this AM, pt had MB last night. also urology odered a storey, but pt refusing storey, voiding with no interventions and no retention.      Dr. Wheat  12/6/24 8:50 AM  Did surgery clear her for dc?  12/6/24 8:51 AM  If so I have no problem letting her go. In their note they said they wanted her to have bm today    RN Message  12/6/24 9:26 AM   Dr. Clarence Cortez from surgery via perfect [employer] said \"Okay to discharge from surgical standpoint\"    Dr. Wheat  12/6/24 9:27 AM  S[facility]

## 2024-12-06 NOTE — PROGRESS NOTES
Rationale for Storey Placement 12/5/24    Pt has a history of PKD s/p Right Kidney Transplant 2007. Left kidney is polycystic. Right kidney shows signs of hydronephrosis on CT with dilation of right ureter and and right renal pelvis. Creatinine is at baseline of 0.8, and patient is not retaining urine (bladder scan 50 mL). The hydronephrosis in setting of no retention is possibly due to high storage pressure. Placement of storey catheter can help in relieving this pressure. Given her risk for damage to her functional kidney, storey placement has been ordered. Urology is consulted.

## 2024-12-06 NOTE — MANAGEMENT PLAN
-The [facility] Internal Medicine-  -ICU to [place] Transfer Note-  HPI from H&P  MsNorman Erickson is a 89 y.o. female with a medical hx significant for PKD s/p Renal Transplant 2007, Melanoma of the scalp s/p excision 2012; now with metastases, who presented from home to the ED on 12/4/24 following an MRI which noted lesions in her brain. She was recently admitted to [facility] w/ symptoms concerning for pneumonia but was found to have a lung mass in her left lower lobe. She was discharged on 11/9 with follow up planned as outpatient. In the interim she states she continued to have extreme fatigue, and began losing her appetite. She had a biopsy of the lung mass performed on 11/19. Pathology report states \"Biopsy, left lower lobe mass: Involved with metastatic melanoma.\" MRI for staging was performed on 12/4 and showed at least 2 metastases with signal consistent with melanoma; in her right external capsule, left hypothalamus, and possibly left parietal lobe, with right cerebral mass effect but no significant midline shift. Given these findings she was instructed to present to [facility] ED with concerns for risk of intramural bleed.     I  ICU Admission Reason & Brief ICU Course:   CT head none after patient was admitted did not show any acute changes.  Patient did not have any focal neurological deficit.  Patient was deemed stable from neurological standpoint and will follow her heme-onc and radiation onc outpatient.  Patient was scheduled for PET scan today at 2 PM today but since she was admitted inpatient, the PET scan could not be done.  A CT chest abdomen pelvis was done instead that showed intussusception of proximal jejunum with mild proximal obstruction.  However patient has no symptoms.  General surgery was consulted and they did not recommend any surgical intervention at this time.  However they will repeat a CT abdomen with contrast.       C  Code Status/DPOA Info/Goals of Care/ACP Note:   -Code Status Full  -Any

## 2024-12-06 NOTE — PLAN OF CARE
Problem: SLP Adult - Impaired Swallowing  Goal: By Discharge: Advance to least restrictive diet without signs or symptoms of aspiration for planned discharge setting.  See evaluation for individualized goals.  12/5/2024 1059 by Luis Rascon SLP  Outcome: Progressing     Problem: Neurosensory - Adult  Goal: Achieves stable or improved neurological status  Outcome: Progressing  Flowsheets  Taken 12/5/2024 2329  Achieves stable or improved neurological status: Assess for and report changes in neurological status  Taken 12/5/2024 1938  Achieves stable or improved neurological status: Assess for and report changes in neurological status  Goal: Absence of seizures  Outcome: Progressing  Flowsheets  Taken 12/5/2024 2329  Absence of seizures: Monitor for seizure activity.  If seizure occurs, document type and location of movements and any associated apnea  Taken 12/5/2024 1938  Absence of seizures: Monitor for seizure activity.  If seizure occurs, document type and location of movements and any associated apnea  Goal: Remains free of injury related to seizures activity  Outcome: Progressing  Flowsheets  Taken 12/5/2024 2329  Remains free of injury related to seizure activity: Maintain airway, patient safety  and administer oxygen as ordered  Taken 12/5/2024 1938  Remains free of injury related to seizure activity: Maintain airway, patient safety  and administer oxygen as ordered  Goal: Achieves maximal functionality and self care  Outcome: Progressing  Flowsheets  Taken 12/5/2024 2329  Achieves maximal functionality and self care: Monitor swallowing and airway patency with patient fatigue and changes in neurological status  Taken 12/5/2024 1938  Achieves maximal functionality and self care: Monitor swallowing and airway patency with patient fatigue and changes in neurological status

## 2024-12-06 NOTE — PROGRESS NOTES
Nursing Core Measures for Brain Tumor patients:   [x]   Care Plan template documentation   [x]   Verified Swallow Screen completed prior to PO intake of food, drink, medications  [x]   VTE Prophylaxis: SCDs ordered/addressed; SCDs: On   [x]   Verified patient has \"Your Guide to Neurosurgery\" booklet at bedside.yes       Reviewed the Following Education with Patient and/or Family:   - Plan for hospital stay, included expected recovery while admitted   - Importance of Follow Up / Post op Appointments at Discharge   - Importance of Compliance with Medications Prescribed at Discharge  - Available hospital and community resources, including Brain Tumor Support Group which meets the 3rd Monday of each month at [facility].     Patient and/or family member: verbalized understanding.         Electronically signed by RUMA ARDON RN on 12/5/2024 at 9:45 PM

## 2024-12-06 NOTE — PROGRESS NOTES
Storey catheter refused by patient at this time.  Pt. Explained the reason, risk and benefits of the storey catheter. Pt. Continues to refuse. ICU provider made aware.    Electronically signed by RUMA ARDON RN on 12/5/2024 at 11:11 PM

## 2024-12-06 NOTE — PROGRESS NOTES
DATA:  CBC:    Recent Labs     12/06/24  0443 12/05/24  0219 12/04/24 2023 11/19/24  0800 11/09/24  0505   WBC 5.7 4.9 6.5  --  6.6   NEUTROABS 4.4 3.6 4.5  --  2.5   LYMPHOPCT 19.0 23.8 25.9  --  41.4   RBC 3.66* 3.74* 4.63  --  3.92*   HGB 10.3* 10.5* 13.2  --  11.4*   HCT 32.3* 33.0* 41.0  --  34.7*   MCV 88.1 88.3 88.6  --  88.6   MCH 28.2 28.2 28.6  --  29.1   MCHC 32.0 31.9 32.2  --  32.8   RDW 17.1* 16.8* 17.2*  --  15.9*    375 434 515* 335       PT/INR:    Recent Labs     12/04/24 2023 11/19/24  0800   INR 1.05 1.09     PTT:    Recent Labs     12/04/24 2023 11/19/24  0800   APTT 33.4 30.2       CMP:    Recent Labs     12/06/24 0443 12/05/24  0220 12/04/24 2023 11/09/24  1322 11/09/24  0505 11/08/24  1525   * 136 136 129* 129* 127*   K 4.0 4.3 4.2  --  4.3 4.3    107 104  --  99 97*   CO2 22 21 22  --  23 22   GLUCOSE 128* 147* 127*  --  83 97   BUN 32* 31* 30*  --  21* 19   CREATININE 0.8 0.8 0.9  --  0.9 0.8   LABGLOM 71 71 62  --  62 71   CALCIUM 8.0* 8.1* 8.6  --  8.1* 8.1*   BILITOT  --   --   --   --   --  0.3   ALKPHOS  --   --   --   --   --  48   ALT  --   --   --   --   --  8*   AST  --   --   --   --   --  27   MG 2.05 2.02  --   --  1.96 1.87       Lab Results   Component Value Date    CALCIUM 8.0 (L) 12/06/2024    PHOS 3.8 12/06/2024       LDH:No results for input(s): \"LDH\" in the last 720 hours.    Radiology Review:  CT ABDOMEN PELVIS WO CONTRAST Additional Contrast? Oral  Narrative: CT ABDOMEN PELVIS WO CONTRAST: 12/5/2024 22:39 EST    CLINICAL INFORMATION:: exam for resolution of jejunal jejunal intussusception    COMPARISON: 5 December 2024 1624 hours.    TECHNIQUE: Computed tomography of the  abdomen, and pelvis was performed according to routine protocol without immediate complication. A dose lowering technique was used for this procedure, which may include, but is not limited to, dose reduction   technique, automated exposure control, the use of iterative  Possible mass left lung base incompletely evaluated  6. Intussusception proximal jejunum. May be due to wall thickening/infiltration    Electronically signed by Santiago Welsh  CT HEAD WO CONTRAST  Narrative: EXAM: CT HEAD WO CONTRAST     CLINICAL  INDICATION: ICH from tumour    COMPARISON: MRI 12/4/2024     TECHNIQUE:CT HEAD WO CONTRAST      This exam was performed according to our departmental dose-optimization program, which includes automated exposure control, adjustment of the mA and/or kV according to patient size and/or use of iterative reconstruction technique.  Unless otherwise   specified, incidental findings do not require dedicated imaging follow-up.     FINDINGS:     There is a heterogeneous mixed attenuation 3.4 x 3.6 cm mass seen within the vicinity of the external limb of the right internal capsule with associated edema, mass effect, sulcus effacement and effacement of the anterior horn of the right lateral   ventricle. Additional 9 mm high attenuation mass seen within the left hypothalamus consistent with recent MRI. There is a minimal amount of leftward midline shift. Also scattered chronic white matter ischemic changes.    The mastoid air cells are clear. Paranasal sinuses are clear.  Calvarium is without acute changes.  Impression: Mixed attenuation masses, the larger of which is seen in the external capsule on the right with associated mass effect and a smaller lesion in the left hypothalamus, consistent with findings on MRI suspicious for metastatic disease such as melanoma.    Electronically signed by Prabhjot Hoffmann      Problem List  Patient Active Problem List   Diagnosis    Lung mass    Metastasis to brain (HCC)    Metastatic melanoma (HCC)       ASSESSMENT AND PLAN:    Metastatic melanoma  -In evaluation of cough and shortness of breath presented to [facility] ER in November 2024  -Subsequently underwent CT scans which showed a large left lower lobe lung mass  -Biopsy of this

## 2024-12-08 NOTE — PROGRESS NOTES
Physician Progress Note      PATIENT:               JOHNATHAN WASHINGTON  CSN #:                  [id number]  :                       1935  ADMIT DATE:       2024 7:45 PM  DISCH DATE:        2024 2:35 PM  RESPONDING  PROVIDER #:        Jarret Guevara MD          QUERY TEXT:    Pt admitted with metastatic melanoma to brain.  Pt noted to have radiology   finding or documentation of sulcal effacement, mass effect.  If clinically   significant, please document in progress notes and discharge summary if you   are evaluating/treating any of the following:    The medical record reflects the following:  Risk Factors: metastatic melanoma to brain  Clinical Indicators: MRI 24 \"There is a right cerebral intra-axial mass   3.7 x 2.9 cm centered along the anterior aspect of the external capsule with   predominantly hyperintense T1/T2 precontrast signal (also hyperintense on   susceptibility weighted series 8) and containing a mild  amount of   intralesional susceptibility compatible with only a component of intratumoral   hemorrhage. There is a thin margin of susceptibility along the border of the   lesion. The postcontrast imaging shows a component of enhancement along the   margins of the lesion. The signal characteristics are c  Treatment: Decadron, Keppra, Neuro surgery consult and Radiation Oncology   consult  Options provided:  -- Cerebral edema  -- Brain compression  -- Cerebral edema and Brain compression  -- Other - I will add my own diagnosis  -- Disagree - Not applicable / Not valid  -- Disagree - Clinically unable to determine / Unknown  -- Refer to Clinical Documentation Reviewer    PROVIDER RESPONSE TEXT:    This patient has brain compression.    Query created by: Betina Almaguer on 2024 2:10 PM      Electronically signed by:  Jarret Guevara MD 2024 10:27 AM

## 2024-12-10 ENCOUNTER — PRE-PROCEDURE TELEPHONE (OUTPATIENT)
Dept: RADIATION ONCOLOGY | Age: 89
End: 2024-12-10

## 2024-12-10 NOTE — PROGRESS NOTES
Attempted to call both patient and son, Graham, to provide instructions for holding Sirolimus prior to Gamma Knife and for 7 days after, per Dr. Hammond's orders. No answer on telephones. Voicemails left for call back. Will try again later.    Amanda Nugent RN

## 2024-12-10 NOTE — PROGRESS NOTES
Spoke to patient's spouse regarding holding Sirolimus starting today and for 7 days after Gamma Knife, per Dr. Hammond. Spouse verbalized understanding.

## 2024-12-13 ENCOUNTER — PRE-PROCEDURE TELEPHONE (OUTPATIENT)
Dept: RADIATION ONCOLOGY | Age: 89
End: 2024-12-13

## 2024-12-13 NOTE — PROGRESS NOTES
INSTRUCTIONS FOR THE DAY OF FRAMELESS GAMMA KNIFE PROCEDURE AT [facility]     1.  Arrive at 0630 to register.  2.  Take all of your usual morning medications the day of the procedure including dexamethasone, keppra, and protonix.  3.  If you are on any blood thinners (Coumadin, Xarelto, Aspirin, Lovenox), you MAY TAKE those medication.  There is no need to stop these medications for your procedure.  4.  If you need pain medication during the night before or the morning of your procedure, you may take them.  5.  Bring a current list of all of your medications with you.  6.  Do not wear any make-up.  7.  Wear comfortable, loose-fitting clothing.  8.  Do not wear jewelry, belts, or any clothing that contains metal.  9.  Visitors will be limited to 1 per patient.      Reviewed all pre-procedure instructions with patient and spouse via telephone.  Answered all questions.  Emily Machado RN

## 2024-12-16 ENCOUNTER — HOSPITAL ENCOUNTER (OUTPATIENT)
Dept: MRI IMAGING | Age: 89
Discharge: HOME | End: 2024-12-16
Attending: NEUROLOGICAL SURGERY
Payer: MEDICARE

## 2024-12-16 ENCOUNTER — HOSPITAL ENCOUNTER (OUTPATIENT)
Dept: RADIATION ONCOLOGY | Age: 89
Discharge: HOME | End: 2024-12-16
Payer: MEDICARE

## 2024-12-16 VITALS
TEMPERATURE: 98.2 F | RESPIRATION RATE: 18 BRPM | HEART RATE: 60 BPM | DIASTOLIC BLOOD PRESSURE: 74 MMHG | SYSTOLIC BLOOD PRESSURE: 145 MMHG | OXYGEN SATURATION: 94 %

## 2024-12-16 DIAGNOSIS — C79.31 METASTASIS TO BRAIN (HCC): ICD-10-CM

## 2024-12-16 PROCEDURE — A9576 INJ PROHANCE MULTIPACK: HCPCS | Performed by: NEUROLOGICAL SURGERY

## 2024-12-16 PROCEDURE — 6360000002 HC RX W HCPCS: Performed by: STUDENT IN AN ORGANIZED HEALTH CARE EDUCATION/TRAINING PROGRAM

## 2024-12-16 PROCEDURE — 6360000004 HC RX CONTRAST MEDICATION: Performed by: NEUROLOGICAL SURGERY

## 2024-12-16 PROCEDURE — 6360000002 HC RX W HCPCS

## 2024-12-16 PROCEDURE — 70553 MRI BRAIN STEM W/O & W/DYE: CPT

## 2024-12-16 PROCEDURE — 77373 STRTCTC BDY RAD THER TX DLVR: CPT

## 2024-12-16 PROCEDURE — 77300 RADIATION THERAPY DOSE PLAN: CPT

## 2024-12-16 PROCEDURE — 6370000000 HC RX 637 (ALT 250 FOR IP): Performed by: STUDENT IN AN ORGANIZED HEALTH CARE EDUCATION/TRAINING PROGRAM

## 2024-12-16 PROCEDURE — 77334 RADIATION TREATMENT AID(S): CPT

## 2024-12-16 PROCEDURE — 77295 3-D RADIOTHERAPY PLAN: CPT

## 2024-12-16 RX ORDER — DEXAMETHASONE 4 MG/1
4 TABLET ORAL EVERY 12 HOURS SCHEDULED
Status: DISCONTINUED | OUTPATIENT
Start: 2024-12-16 | End: 2024-12-17 | Stop reason: HOSPADM

## 2024-12-16 RX ORDER — LORAZEPAM 2 MG/ML
INJECTION INTRAMUSCULAR
Status: COMPLETED
Start: 2024-12-16 | End: 2024-12-16

## 2024-12-16 RX ORDER — LEVETIRACETAM 500 MG/1
500 TABLET ORAL 2 TIMES DAILY
Status: DISCONTINUED | OUTPATIENT
Start: 2024-12-16 | End: 2024-12-17 | Stop reason: HOSPADM

## 2024-12-16 RX ORDER — LORAZEPAM 2 MG/ML
0.5 INJECTION INTRAMUSCULAR ONCE
Status: COMPLETED | OUTPATIENT
Start: 2024-12-16 | End: 2024-12-16

## 2024-12-16 RX ADMIN — LORAZEPAM 0.5 MG: 2 INJECTION INTRAMUSCULAR; INTRAVENOUS at 09:30

## 2024-12-16 RX ADMIN — LORAZEPAM 0.5 MG: 2 INJECTION INTRAMUSCULAR at 09:30

## 2024-12-16 RX ADMIN — DEXAMETHASONE 4 MG: 4 TABLET ORAL at 11:11

## 2024-12-16 RX ADMIN — GADOTERIDOL 132 ML: 279.3 INJECTION, SOLUTION INTRAVENOUS at 07:55

## 2024-12-16 RX ADMIN — LEVETIRACETAM 500 MG: 500 TABLET, FILM COATED ORAL at 11:11

## 2024-12-16 NOTE — OP NOTE
landmarks were compared with the reference cone-beam CT to confirm set-up accuracy. The patient then underwent stereotactic radiosurgery treatment delivery using the parameters listed in the Gamma Knife Procedure Continuing Physics document (under Media Tab). Intra-fraction movement was monitored by infrared throughout the procedure.     Target Size  Volume  Shape Shots Dose Fractions Isodose    cm cc   Gy  %   Left hypothalamus 1.32 1.06 Oval 15 25 5 57   Right frontal 3.81 20.5 Oval 52 25 5 53     Both targets are complex based on size >3.5 cm (right frontal) or location within 5 mm of the optic structures (left hypothalamus).    The patient tolerated the procedure without difficulty. The patient was instructed on medications and the need for follow-up in two weeks.    SPECIMENS REMOVED: None    ESTIMATED BLOOD LOSS: None    TOTAL TIME SPENT WITH PATIENT: In conformance with CMS regulations, I affirm that I participated in target contouring and development of the treatment plan. I was present at fractions #1 and #2 for patient positioning, review of cone-beam CT to confirm set-up accuracy, and radiation delivery.     Jeff Carrasquillo MD

## 2024-12-16 NOTE — PROGRESS NOTES
Patients fingers are mottled but blanchable. Arms are weeping foam dressing 4x4 and kerlix placed. Dr. Michel is aware. Patients oxygen 94% RA.  Son, Michael Erickson, is aware.

## 2024-12-16 NOTE — PROGRESS NOTES
Gamma Knife Radiation Delivery Time Out    1.  Patient states name and birthdate correctly? Yes    2.  Procedure listed on consent Stereotactic Radiosurgery, Gamma Knife for multiple brain metastases    3. Is this the correct procedure? Yes    4. Is this a trigeminal neuralgia case? No    -If yes, what side are we treating? N/A    -If yes, is the side marked for laterality?  N/A    5.  Has the patient received steroids prior to radiation delivery?  yes    6.  Does the patient require Keppra prior to treatment delivery? yes    7.  Does the patient require Ativan prior to treatment delivery?  no    8.  Are all present in agreement?  Yes    9. Those present for time out:  Alison Metz RN, Lahsa Buenrostro

## 2024-12-16 NOTE — PROGRESS NOTES
Patient completed today's treatment without issues. Discharge instructions offered to patient including signs and symptoms to monitor for. Patient declined paper copy but verbalizes understanding.     Patient aware of next treatment date and time, tomorrow at 0900.     Amanda Nugent RN

## 2024-12-17 ENCOUNTER — HOSPITAL ENCOUNTER (OUTPATIENT)
Dept: RADIATION ONCOLOGY | Age: 89
Discharge: HOME | End: 2024-12-17
Payer: MEDICARE

## 2024-12-17 PROCEDURE — 77373 STRTCTC BDY RAD THER TX DLVR: CPT

## 2024-12-17 NOTE — PROCEDURES
PROCEDURE NOTE  Date: 12/17/2024   Name: Jonna Erickson  YOB: 1935    Procedures      Gamma Knife Radiosurgery Procedure Note      Patient: Jonna Erickson  Date: 12/17/2024    Diagnosis:Multifocal brain Metastases from metastatic melanoma    Procedure: Fractionated frameless Gamma Knife Stereotactic Radiosurgery (SRS)    Today's # / Planned number of fractions:    R frontal metastasis: Fraction 2/5, Dose 1000 cGy/2500 cGy.  L hypothalamic metastasis: Fraction 2/5, Dose 57020/2500 cGy.    Description of procedure:  Jonna presented to the [facility] Gamma Knife [facility].        The patient was taken to the treatment machine. A CBCT was obtained, confirming appropriate patient setup.   Treatment was then delivered successfully.        See prior note for details of plan dosimetry.      She will return tomorrow for their next fraction.     Kitty Portillo MD

## 2024-12-17 NOTE — PROGRESS NOTES
Patient completed today's treatment without issues. Discharge instructions offered to patient including signs and symptoms to monitor for. Patient declined paper copy but verbalizes understanding.     Patient aware of next treatment date and time.

## 2024-12-17 NOTE — ONCOLOGY
Gamma Knife Radiosurgery Procedure Note      Patient: Jonna Erickson  Date: 12/16/2024    Diagnosis:Multifocal brain Metastases (Melanoma)    Procedure: Fractionated Gamma Knife Based Stereotactic Radiosurgery (ICON)    Description of procedure:  Marilin was met at the [facility] Gamma [facility]ife [facility] at 6:00 AM on 12/16/2024 by Dr. Carrasquillo and myself. [Pool Michel MD]    A stereotactic MRI brain with double-dose contrast was completed. Relocatable head immobilizer was fabricated. A CBCT was completed to define the stereotactic reference.    Dosimetry of SRS is summarized as follows:   R frontal metastasis: Fraction 1/5, Dose 500 cGy/2500 cGy.  L hypothalamic metastasis: Fraction 15, Dose 500/2500 cGy.    Dosimetry was reviewed by \"Prabhjot Lowery MS (special physics consult requested) and myself. Treatment was then given successfully. The patient was discharged home in stable condition. She will return for fraction 2 of 5 tomorrow.      Pool Michel MD

## 2024-12-17 NOTE — PROGRESS NOTES
Patient arrived to Gamma Knife department with spouse for fraction 2/5.  Medications updated.       Amanda Nugent RN

## 2024-12-18 ENCOUNTER — HOSPITAL ENCOUNTER (OUTPATIENT)
Dept: RADIATION ONCOLOGY | Age: 89
Discharge: HOME | End: 2024-12-18
Payer: MEDICARE

## 2024-12-18 PROCEDURE — 77373 STRTCTC BDY RAD THER TX DLVR: CPT

## 2024-12-18 NOTE — PROGRESS NOTES
Patient completed today's treatment without issues. Discharge instructions offered to patient including signs and symptoms to monitor for. Patient declined paper copy but verbalizes understanding.     Patient aware of next treatment date and time.     Amanda Nugent RN

## 2024-12-18 NOTE — PROGRESS NOTES
Patient arrived to Gamma Knife department with spouse for fraction 3/5.  Medications updated.       Amanda Nugent RN

## 2024-12-19 ENCOUNTER — HOSPITAL ENCOUNTER (OUTPATIENT)
Dept: RADIATION ONCOLOGY | Age: 89
Discharge: HOME | End: 2024-12-19
Payer: MEDICARE

## 2024-12-19 PROCEDURE — 77373 STRTCTC BDY RAD THER TX DLVR: CPT

## 2024-12-19 NOTE — PROCEDURES
12/19/24    Gamma Knife Radiosurgery Procedure Note      Diagnosis: brain metastases     Description of procedure:      Jonna Erickson was met at the [facility] Gamma Knife Center today by myself. An aquaplast mask was placed for immobilization. A cone beam CT was then completed to confirm patient positioning. The following lesion(s) was/were identified and targeted:     Target Size  Volume  Shape Shots Dose Fractions Isodose     cm cc     Gy   %   Left hypothalamus 1.32 1.06 Oval 15 5 x 5 = 25 4 of 5 57   Right frontal 3.81 20.5 Oval 52 5 x 5 = 25 4 of 5 53      Both targets are complex based on size >3.5 cm (right frontal) or location within 5 mm of the optic structures (left hypothalamus).    Dosimetry was reviewed by myself and Prabhjot Ingram. Treatment was then given successfully. The mask was removed without complication. The patient was discharged home in stable condition with medication instructions and a follow appointment.    Moises Hammond MD, RENEA  Radiation Oncology  [facility]  Email: [email or web address]

## 2024-12-20 ENCOUNTER — HOSPITAL ENCOUNTER (OUTPATIENT)
Dept: RADIATION ONCOLOGY | Age: 89
Discharge: HOME | End: 2024-12-20
Payer: MEDICARE

## 2024-12-20 PROCEDURE — 77336 RADIATION PHYSICS CONSULT: CPT

## 2024-12-20 PROCEDURE — 77373 STRTCTC BDY RAD THER TX DLVR: CPT

## 2024-12-20 NOTE — PROGRESS NOTES
Patient completed final treatment without issues. Discharge instructions were reviewed with patient who verbalized understanding using teach back method. A written copy of the instructions along with a steroid taper calendar was also given. Patient has follow up appointments scheduled.    Patient was accompanied to transportation by this RN.

## 2024-12-20 NOTE — PROCEDURES
12/20/24     Gamma Knife Radiosurgery Procedure Note      Diagnosis: brain metastases     Description of procedure:      Jonna Erickson was met at the [facility] Gamma Knife [facility] today by myself. An aquaplast mask was placed for immobilization. A cone beam CT was then completed to confirm patient positioning. The following lesion(s) was/were identified and targeted:      Target Size  Volume  Shape Shots Dose Fractions Isodose     cm cc     Gy   %   Left hypothalamus 1.32 1.06 Oval 15 5 x 5 = 25 5 of 5 57   Right frontal 3.81 20.5 Oval 52 5 x 5 = 25 5 of 5 53      Both targets are complex based on size >3.5 cm (right frontal) or location within 5 mm of the optic structures (left hypothalamus).     Dosimetry was reviewed by myself and Prabhjot Ingram. Treatment was then given successfully. The mask was removed without complication. The patient was discharged home in stable condition with medication instructions and a follow appointment.

## 2024-12-20 NOTE — DISCHARGE INSTRUCTIONS
GAMMA KNIFE POST-PROCEDURE INSTRUCTIONS    You may return to work or school after 24 hours if you feel well enough.  You should resume all of your regular activities unless the physician has instructed you otherwise.  You may resume your usual diet right away.  Follow up with your physician as directed.  Please contact Dr. Carrasquillo at [phone] or Gamma knife at [phone] with any new vision changes, balance problems, numbness/tingling, or severe headache. If you have an emergent concern and need to be seen by a physician immediately, please go to [facility] emergency room.  Steroid taper:  take your evening dose of dexamethasone tonight as directed. Starting tomorrow,   - take 4mg TWICE a day for TWO days, then  - take 4mg ONCE a day for TWO days, then  - take 2mg ONCE a day for TWO days, then STOP  Continue taking your Keppra as prescribed until your follow up call in two weeks.  Continue taking your Protonix while on dexamethasone  You may resume Sirolimus on 12/28.    If you have any questions during regular business hours, call the Gamma Knife nurse at [phone].  For questions during off-business hours and on weekends, contact Dr. Carrasquillo's office directly at [phone]. For Inocencia Lowry, Marilu or Manish call [phone].

## 2024-12-26 ENCOUNTER — APPOINTMENT (OUTPATIENT)
Dept: GENERAL RADIOLOGY | Age: 89
DRG: 699 | End: 2024-12-26
Payer: MEDICARE

## 2024-12-26 ENCOUNTER — HOSPITAL ENCOUNTER (INPATIENT)
Age: 89
LOS: 6 days | Discharge: HOSPICE | DRG: 699 | End: 2025-01-01
Attending: EMERGENCY MEDICINE | Admitting: INTERNAL MEDICINE
Payer: MEDICARE

## 2024-12-26 ENCOUNTER — APPOINTMENT (OUTPATIENT)
Dept: CT IMAGING | Age: 89
DRG: 699 | End: 2024-12-26
Payer: MEDICARE

## 2024-12-26 DIAGNOSIS — F41.9 ANXIETY: ICD-10-CM

## 2024-12-26 DIAGNOSIS — Z51.5 HOSPICE CARE: ICD-10-CM

## 2024-12-26 DIAGNOSIS — R21 RASH: ICD-10-CM

## 2024-12-26 DIAGNOSIS — J90 PLEURAL EFFUSION: Primary | ICD-10-CM

## 2024-12-26 DIAGNOSIS — R06.02 SHORTNESS OF BREATH: ICD-10-CM

## 2024-12-26 DIAGNOSIS — C43.9 METASTATIC MELANOMA (HCC): ICD-10-CM

## 2024-12-26 PROBLEM — N17.9 AKI (ACUTE KIDNEY INJURY) (HCC): Status: ACTIVE | Noted: 2024-12-26

## 2024-12-26 LAB
ALBUMIN SERPL-MCNC: 2.8 G/DL (ref 3.4–5)
ALBUMIN/GLOB SERPL: 0.9 {RATIO} (ref 1.1–2.2)
ALP SERPL-CCNC: 70 U/L (ref 40–129)
ALT SERPL-CCNC: 20 U/L (ref 10–40)
ANION GAP SERPL CALCULATED.3IONS-SCNC: 11 MMOL/L (ref 3–16)
AST SERPL-CCNC: 47 U/L (ref 15–37)
BASE EXCESS BLDV CALC-SCNC: -0.2 MMOL/L (ref -2–3)
BASOPHILS # BLD: 0 K/UL (ref 0–0.2)
BASOPHILS NFR BLD: 0.3 %
BILIRUB SERPL-MCNC: 0.6 MG/DL (ref 0–1)
BUN SERPL-MCNC: 66 MG/DL (ref 7–20)
CALCIUM SERPL-MCNC: 8.5 MG/DL (ref 8.3–10.6)
CHLORIDE SERPL-SCNC: 103 MMOL/L (ref 99–110)
CO2 BLDV-SCNC: 27 MMOL/L
CO2 SERPL-SCNC: 21 MMOL/L (ref 21–32)
COHGB MFR BLDV: 2.4 % (ref 0–1.5)
CREAT SERPL-MCNC: 1.8 MG/DL (ref 0.6–1.2)
DEPRECATED RDW RBC AUTO: 19.9 % (ref 12.4–15.4)
DO-HGB MFR BLDV: 50.5 %
EKG ATRIAL RATE: 64 BPM
EKG DIAGNOSIS: NORMAL
EKG P AXIS: 42 DEGREES
EKG P-R INTERVAL: 174 MS
EKG Q-T INTERVAL: 442 MS
EKG QRS DURATION: 124 MS
EKG QTC CALCULATION (BAZETT): 455 MS
EKG R AXIS: -38 DEGREES
EKG T AXIS: 50 DEGREES
EKG VENTRICULAR RATE: 64 BPM
EOSINOPHIL # BLD: 0 K/UL (ref 0–0.6)
EOSINOPHIL NFR BLD: 0.1 %
GFR SERPLBLD CREATININE-BSD FMLA CKD-EPI: 27 ML/MIN/{1.73_M2}
GLUCOSE SERPL-MCNC: 137 MG/DL (ref 70–99)
HCO3 BLDV-SCNC: 25.6 MMOL/L (ref 24–28)
HCT VFR BLD AUTO: 39.6 % (ref 36–48)
HGB BLD-MCNC: 12.9 G/DL (ref 12–16)
LYMPHOCYTES # BLD: 0.7 K/UL (ref 1–5.1)
LYMPHOCYTES NFR BLD: 7.4 %
MCH RBC QN AUTO: 29.3 PG (ref 26–34)
MCHC RBC AUTO-ENTMCNC: 32.5 G/DL (ref 31–36)
MCV RBC AUTO: 90 FL (ref 80–100)
METHGB MFR BLDV: 1.2 % (ref 0–1.5)
MONOCYTES # BLD: 0.2 K/UL (ref 0–1.3)
MONOCYTES NFR BLD: 1.9 %
NEUTROPHILS # BLD: 8.3 K/UL (ref 1.7–7.7)
NEUTROPHILS NFR BLD: 90.3 %
NT-PROBNP SERPL-MCNC: 8964 PG/ML (ref 0–449)
PCO2 BLDV: 45.8 MMHG (ref 41–51)
PH BLDV: 7.36 [PH] (ref 7.35–7.45)
PLATELET # BLD AUTO: 193 K/UL (ref 135–450)
PMV BLD AUTO: 6.5 FL (ref 5–10.5)
PO2 BLDV: <30 MMHG (ref 25–40)
POTASSIUM SERPL-SCNC: 4.6 MMOL/L (ref 3.5–5.1)
PROT SERPL-MCNC: 5.9 G/DL (ref 6.4–8.2)
RBC # BLD AUTO: 4.4 M/UL (ref 4–5.2)
SAO2 % BLDV: 48 %
SODIUM SERPL-SCNC: 135 MMOL/L (ref 136–145)
TROPONIN, HIGH SENSITIVITY: 63 NG/L (ref 0–14)
TROPONIN, HIGH SENSITIVITY: 73 NG/L (ref 0–14)
WBC # BLD AUTO: 9.2 K/UL (ref 4–11)

## 2024-12-26 PROCEDURE — 2060000000 HC ICU INTERMEDIATE R&B

## 2024-12-26 PROCEDURE — 82803 BLOOD GASES ANY COMBINATION: CPT

## 2024-12-26 PROCEDURE — 85025 COMPLETE CBC W/AUTO DIFF WBC: CPT

## 2024-12-26 PROCEDURE — 6370000000 HC RX 637 (ALT 250 FOR IP)

## 2024-12-26 PROCEDURE — 71046 X-RAY EXAM CHEST 2 VIEWS: CPT

## 2024-12-26 PROCEDURE — 84484 ASSAY OF TROPONIN QUANT: CPT

## 2024-12-26 PROCEDURE — 6360000004 HC RX CONTRAST MEDICATION

## 2024-12-26 PROCEDURE — 71275 CT ANGIOGRAPHY CHEST: CPT

## 2024-12-26 PROCEDURE — 93005 ELECTROCARDIOGRAM TRACING: CPT | Performed by: EMERGENCY MEDICINE

## 2024-12-26 PROCEDURE — 83880 ASSAY OF NATRIURETIC PEPTIDE: CPT

## 2024-12-26 PROCEDURE — 80053 COMPREHEN METABOLIC PANEL: CPT

## 2024-12-26 PROCEDURE — 6360000002 HC RX W HCPCS

## 2024-12-26 PROCEDURE — 6360000002 HC RX W HCPCS: Performed by: NURSE PRACTITIONER

## 2024-12-26 PROCEDURE — 99285 EMERGENCY DEPT VISIT HI MDM: CPT

## 2024-12-26 RX ORDER — HEPARIN SODIUM 5000 [USP'U]/ML
5000 INJECTION, SOLUTION INTRAVENOUS; SUBCUTANEOUS EVERY 8 HOURS SCHEDULED
Status: DISCONTINUED | OUTPATIENT
Start: 2024-12-27 | End: 2025-01-01 | Stop reason: HOSPADM

## 2024-12-26 RX ORDER — PANTOPRAZOLE SODIUM 40 MG/10ML
40 INJECTION, POWDER, LYOPHILIZED, FOR SOLUTION INTRAVENOUS ONCE
Status: COMPLETED | OUTPATIENT
Start: 2024-12-26 | End: 2024-12-26

## 2024-12-26 RX ORDER — HYDRALAZINE HYDROCHLORIDE 20 MG/ML
10 INJECTION INTRAMUSCULAR; INTRAVENOUS EVERY 6 HOURS PRN
Status: DISCONTINUED | OUTPATIENT
Start: 2024-12-26 | End: 2024-12-27

## 2024-12-26 RX ORDER — LEVETIRACETAM 250 MG/1
500 TABLET ORAL ONCE
Status: COMPLETED | OUTPATIENT
Start: 2024-12-26 | End: 2024-12-26

## 2024-12-26 RX ORDER — ONDANSETRON 2 MG/ML
4 INJECTION INTRAMUSCULAR; INTRAVENOUS EVERY 6 HOURS PRN
Status: DISCONTINUED | OUTPATIENT
Start: 2024-12-26 | End: 2024-12-27

## 2024-12-26 RX ORDER — SODIUM CHLORIDE 0.9 % (FLUSH) 0.9 %
5-40 SYRINGE (ML) INJECTION PRN
Status: DISCONTINUED | OUTPATIENT
Start: 2024-12-26 | End: 2025-01-01 | Stop reason: HOSPADM

## 2024-12-26 RX ORDER — SODIUM CHLORIDE 9 MG/ML
INJECTION, SOLUTION INTRAVENOUS PRN
Status: DISCONTINUED | OUTPATIENT
Start: 2024-12-26 | End: 2025-01-01 | Stop reason: HOSPADM

## 2024-12-26 RX ORDER — SODIUM CHLORIDE 0.9 % (FLUSH) 0.9 %
5-40 SYRINGE (ML) INJECTION EVERY 12 HOURS SCHEDULED
Status: DISCONTINUED | OUTPATIENT
Start: 2024-12-27 | End: 2025-01-01 | Stop reason: HOSPADM

## 2024-12-26 RX ORDER — ACETAMINOPHEN 325 MG/1
650 TABLET ORAL EVERY 6 HOURS PRN
Status: DISCONTINUED | OUTPATIENT
Start: 2024-12-26 | End: 2025-01-01 | Stop reason: HOSPADM

## 2024-12-26 RX ORDER — LEVETIRACETAM 500 MG/1
500 TABLET ORAL 2 TIMES DAILY
Status: DISCONTINUED | OUTPATIENT
Start: 2024-12-27 | End: 2025-01-01 | Stop reason: HOSPADM

## 2024-12-26 RX ORDER — POLYETHYLENE GLYCOL 3350 17 G/17G
17 POWDER, FOR SOLUTION ORAL DAILY PRN
Status: DISCONTINUED | OUTPATIENT
Start: 2024-12-26 | End: 2025-01-01 | Stop reason: HOSPADM

## 2024-12-26 RX ORDER — ONDANSETRON 4 MG/1
4 TABLET, ORALLY DISINTEGRATING ORAL EVERY 8 HOURS PRN
Status: DISCONTINUED | OUTPATIENT
Start: 2024-12-26 | End: 2024-12-27

## 2024-12-26 RX ORDER — IOPAMIDOL 755 MG/ML
75 INJECTION, SOLUTION INTRAVASCULAR
Status: COMPLETED | OUTPATIENT
Start: 2024-12-26 | End: 2024-12-26

## 2024-12-26 RX ORDER — ACETAMINOPHEN 650 MG/1
650 SUPPOSITORY RECTAL EVERY 6 HOURS PRN
Status: DISCONTINUED | OUTPATIENT
Start: 2024-12-26 | End: 2025-01-01 | Stop reason: HOSPADM

## 2024-12-26 RX ADMIN — HYDRALAZINE HYDROCHLORIDE 10 MG: 20 INJECTION INTRAMUSCULAR; INTRAVENOUS at 23:11

## 2024-12-26 RX ADMIN — LEVETIRACETAM 500 MG: 250 TABLET, FILM COATED ORAL at 22:30

## 2024-12-26 RX ADMIN — IOPAMIDOL 75 ML: 755 INJECTION, SOLUTION INTRAVENOUS at 19:39

## 2024-12-26 RX ADMIN — PANTOPRAZOLE SODIUM 40 MG: 40 INJECTION, POWDER, FOR SOLUTION INTRAVENOUS at 22:30

## 2024-12-26 ASSESSMENT — LIFESTYLE VARIABLES
HOW MANY STANDARD DRINKS CONTAINING ALCOHOL DO YOU HAVE ON A TYPICAL DAY: PATIENT DOES NOT DRINK
HOW OFTEN DO YOU HAVE A DRINK CONTAINING ALCOHOL: NEVER

## 2024-12-26 ASSESSMENT — PAIN SCALES - GENERAL: PAINLEVEL_OUTOF10: 0

## 2024-12-26 ASSESSMENT — PAIN - FUNCTIONAL ASSESSMENT: PAIN_FUNCTIONAL_ASSESSMENT: NONE - DENIES PAIN

## 2024-12-26 NOTE — ED PROVIDER NOTES
ED Attending Attestation Note     Date of evaluation: 12/26/2024    This patient was seen by the advance practice provider.  I have seen and examined the patient, agree with the workup, evaluation, management and diagnosis. The care plan has been discussed.  I have reviewed the ECG and concur with the [facility]'s interpretation.  My assessment reveals adult female with reported shortness of breath.  Been going on for several weeks, apparently worse today.  She seems to have some diminished lung sounds on the left side.  Denies any chest pain currently, laying in bed does not have shortness of breath, and on 2 L is satting 100%.  She does have some cyanosis in her bilateral hands, or at least purple discoloration of her distal fingers, but denies pain or loss of sensation, is able to move them, and has radial and ulnar pulses.  EKG shows sinus rhythm with left bundle branch block.  There is some ST elevation in V2 and V3 consistent with deep Q waves and expected in the setting of this left bundle.  No old EKGs are available for comparison immediately.  Will obtain labs as well as chest x-ray and likely CTPA for evaluation.     Roosevelt Kramer MD  12/26/24 8681

## 2024-12-26 NOTE — ED PROVIDER NOTES
THE [facility]  EMERGENCY DEPARTMENT ENCOUNTER          PHYSICIAN ASSISTANT NOTE       Date of evaluation: 12/26/2024    Chief Complaint     Shortness of Breath (Pt states they have increased SOB for the last couple weeks. Per EMS pt O2 saturation was 80% on RA when they arrived. Pt placed on 2L NC by EMS and stated O2 came up to 97%)      History of Present Illness     Jonna Erickson is a 89 y.o. female with history of kidney transplant, SIADH, left lower lobe lung mass and metastatic melanoma.  Who presents to the emergency department after being brought in by EMS for shortness of breath.  EMS reports patient has had increased shortness of breath over the past couple weeks.  Upon arrival EMS reported the O2 saturation was 80% on room air.  After applying 2 L of oxygen her O2 sat came up to 97%.  Patient denies having any chest pain, active shortness of breath or difficulty breathing, fevers, or hemoptysis.  After speaking with patient's family member, they report patient typically has an O2 saturation in the low 90s at baseline however today they noticed her requiring to take more breaths and having difficulty when drinking water.  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  Patient has undergone recent fractionated gamma knife based stereotactic radiosurgery to address this.  Patient also had left lower lobe mass on her lungs.    ASSESSMENT / PLAN  (MEDICAL DECISION MAKING)     INITIAL VITALS: BP: (!) 182/82, Temp: 97.8 °F (36.6 °C), Pulse: 67, Respirations: 16, SpO2: 97 %    Jonna Erickson is a 89 y.o. female presenting to the emergency department for worsening shortness of breath.  Family reports she has had ongoing shortness of breath for the past couple weeks however today there was a noticeable drop in her O2 saturation compared to baseline and she appeared to need to take multiple breaths while drinking water.    On Initial lab work patient did have EKG showed normal sinus rhythm  Respirations: 16, SpO2: 97 %  Physical Exam    General: in no apparent distress, well developed, well nourished, non-toxic appearance.  Does appear little fluid overloaded    HEENT: Atraumatic, normocephalic.     Neck: Full range of motion.     Chest/pulm: Respiratory rate normal. Speaks in complete sentences, no respiratory distress. Lungs are clear to auscultation bilaterally.  Decreased breath sounds in lower bases.  No rales, rhonchi, or wheezing.    Cardiovascular: Heart rate normal. RRR. No rubs, galops, or mummurs.     Abdomen: No gross distension. Soft, non-tender, no peritoneal.     : Deferred.     Musculoskeletal: No obvious joint deformity.  Full ROM of all extremities     Vascular: 2+ distal pulses.     Neuro: A&O x 4. Normal speech without dysarthria or aphasia. Moves all extremities spontaneously and symmetrically.     Skin: Warm, dry. No obvious rashes, petechiae, or purpura.     Psych: Appropriate mood and affect, normal interaction.       Sherry Roman PA-C  12/27/24 0051

## 2024-12-27 LAB
ALBUMIN SERPL-MCNC: 2.6 G/DL (ref 3.4–5)
ANION GAP SERPL CALCULATED.3IONS-SCNC: 11 MMOL/L (ref 3–16)
BACTERIA URNS QL MICRO: ABNORMAL /HPF
BASOPHILS # BLD: 0 K/UL (ref 0–0.2)
BASOPHILS NFR BLD: 0.3 %
BILIRUB UR QL STRIP.AUTO: NEGATIVE
BUN SERPL-MCNC: 67 MG/DL (ref 7–20)
CALCIUM SERPL-MCNC: 8.1 MG/DL (ref 8.3–10.6)
CHLORIDE SERPL-SCNC: 103 MMOL/L (ref 99–110)
CLARITY UR: CLEAR
CO2 SERPL-SCNC: 21 MMOL/L (ref 21–32)
COLOR UR: YELLOW
CREAT SERPL-MCNC: 1.9 MG/DL (ref 0.6–1.2)
CREAT UR-MCNC: 29.3 MG/DL (ref 28–259)
DEPRECATED RDW RBC AUTO: 20.1 % (ref 12.4–15.4)
EOSINOPHIL # BLD: 0 K/UL (ref 0–0.6)
EOSINOPHIL NFR BLD: 0.1 %
EPI CELLS #/AREA URNS HPF: ABNORMAL /HPF (ref 0–5)
GFR SERPLBLD CREATININE-BSD FMLA CKD-EPI: 25 ML/MIN/{1.73_M2}
GLUCOSE SERPL-MCNC: 88 MG/DL (ref 70–99)
GLUCOSE UR STRIP.AUTO-MCNC: NEGATIVE MG/DL
HCT VFR BLD AUTO: 34.9 % (ref 36–48)
HGB BLD-MCNC: 11.4 G/DL (ref 12–16)
HGB UR QL STRIP.AUTO: NEGATIVE
KETONES UR STRIP.AUTO-MCNC: NEGATIVE MG/DL
LEGIONELLA AG UR QL: NORMAL
LEUKOCYTE ESTERASE UR QL STRIP.AUTO: ABNORMAL
LYMPHOCYTES # BLD: 1 K/UL (ref 1–5.1)
LYMPHOCYTES NFR BLD: 9.7 %
MAGNESIUM SERPL-MCNC: 2.1 MG/DL (ref 1.8–2.4)
MCH RBC QN AUTO: 29.5 PG (ref 26–34)
MCHC RBC AUTO-ENTMCNC: 32.8 G/DL (ref 31–36)
MCV RBC AUTO: 90.1 FL (ref 80–100)
MONOCYTES # BLD: 0.6 K/UL (ref 0–1.3)
MONOCYTES NFR BLD: 5.5 %
NEUTROPHILS # BLD: 8.7 K/UL (ref 1.7–7.7)
NEUTROPHILS NFR BLD: 84.4 %
NITRITE UR QL STRIP.AUTO: NEGATIVE
PH UR STRIP.AUTO: 7.5 [PH] (ref 5–8)
PHOSPHATE SERPL-MCNC: 3.9 MG/DL (ref 2.5–4.9)
PLATELET # BLD AUTO: 174 K/UL (ref 135–450)
PMV BLD AUTO: 7 FL (ref 5–10.5)
POTASSIUM SERPL-SCNC: 4 MMOL/L (ref 3.5–5.1)
PROCALCITONIN SERPL IA-MCNC: 0.87 NG/ML (ref 0–0.15)
PROT UR STRIP.AUTO-MCNC: 100 MG/DL
PROT UR-MCNC: 83.2 MG/DL
PROT/CREAT UR-RTO: 2.8 MG/DL
RBC # BLD AUTO: 3.87 M/UL (ref 4–5.2)
RBC #/AREA URNS HPF: ABNORMAL /HPF (ref 0–4)
S PNEUM AG UR QL: NORMAL
SODIUM SERPL-SCNC: 135 MMOL/L (ref 136–145)
SODIUM UR-SCNC: 65 MMOL/L
SP GR UR STRIP.AUTO: 1.01 (ref 1–1.03)
TACROLIMUS BLOOD: <0.8 NG/ML (ref 5–20)
UA COMPLETE W REFLEX CULTURE PNL UR: ABNORMAL
UA DIPSTICK W REFLEX MICRO PNL UR: YES
URN SPEC COLLECT METH UR: ABNORMAL
UROBILINOGEN UR STRIP-ACNC: 0.2 E.U./DL
WBC # BLD AUTO: 10.3 K/UL (ref 4–11)
WBC #/AREA URNS HPF: ABNORMAL /HPF (ref 0–5)

## 2024-12-27 PROCEDURE — 99223 1ST HOSP IP/OBS HIGH 75: CPT | Performed by: INTERNAL MEDICINE

## 2024-12-27 PROCEDURE — 6360000002 HC RX W HCPCS

## 2024-12-27 PROCEDURE — 97116 GAIT TRAINING THERAPY: CPT

## 2024-12-27 PROCEDURE — 84300 ASSAY OF URINE SODIUM: CPT

## 2024-12-27 PROCEDURE — 86807 CYTOTOXIC ANTIBODY SCREENING: CPT

## 2024-12-27 PROCEDURE — 84156 ASSAY OF PROTEIN URINE: CPT

## 2024-12-27 PROCEDURE — 83735 ASSAY OF MAGNESIUM: CPT

## 2024-12-27 PROCEDURE — 84145 PROCALCITONIN (PCT): CPT

## 2024-12-27 PROCEDURE — 2060000000 HC ICU INTERMEDIATE R&B

## 2024-12-27 PROCEDURE — 85025 COMPLETE CBC W/AUTO DIFF WBC: CPT

## 2024-12-27 PROCEDURE — 6370000000 HC RX 637 (ALT 250 FOR IP)

## 2024-12-27 PROCEDURE — 97530 THERAPEUTIC ACTIVITIES: CPT

## 2024-12-27 PROCEDURE — 87186 SC STD MICRODIL/AGAR DIL: CPT

## 2024-12-27 PROCEDURE — 80197 ASSAY OF TACROLIMUS: CPT

## 2024-12-27 PROCEDURE — 82570 ASSAY OF URINE CREATININE: CPT

## 2024-12-27 PROCEDURE — 2500000003 HC RX 250 WO HCPCS

## 2024-12-27 PROCEDURE — 97162 PT EVAL MOD COMPLEX 30 MIN: CPT

## 2024-12-27 PROCEDURE — 81001 URINALYSIS AUTO W/SCOPE: CPT

## 2024-12-27 PROCEDURE — 2580000003 HC RX 258

## 2024-12-27 PROCEDURE — 86021 WBC ANTIBODY IDENTIFICATION: CPT

## 2024-12-27 PROCEDURE — 36415 COLL VENOUS BLD VENIPUNCTURE: CPT

## 2024-12-27 PROCEDURE — 87449 NOS EACH ORGANISM AG IA: CPT

## 2024-12-27 PROCEDURE — 97166 OT EVAL MOD COMPLEX 45 MIN: CPT

## 2024-12-27 PROCEDURE — 87086 URINE CULTURE/COLONY COUNT: CPT

## 2024-12-27 PROCEDURE — 87077 CULTURE AEROBIC IDENTIFY: CPT

## 2024-12-27 PROCEDURE — 80069 RENAL FUNCTION PANEL: CPT

## 2024-12-27 PROCEDURE — 97535 SELF CARE MNGMENT TRAINING: CPT

## 2024-12-27 RX ORDER — OXYCODONE HYDROCHLORIDE 5 MG/1
5 TABLET ORAL EVERY 6 HOURS PRN
Status: ON HOLD | COMMUNITY
End: 2024-12-31

## 2024-12-27 RX ORDER — ONDANSETRON 2 MG/ML
8 INJECTION INTRAMUSCULAR; INTRAVENOUS EVERY 6 HOURS PRN
Status: DISCONTINUED | OUTPATIENT
Start: 2024-12-27 | End: 2025-01-01 | Stop reason: HOSPADM

## 2024-12-27 RX ORDER — PREDNISONE 10 MG/1
10 TABLET ORAL DAILY
COMMUNITY

## 2024-12-27 RX ORDER — ONDANSETRON 4 MG/1
8 TABLET, ORALLY DISINTEGRATING ORAL EVERY 8 HOURS PRN
Status: DISCONTINUED | OUTPATIENT
Start: 2024-12-27 | End: 2025-01-01 | Stop reason: HOSPADM

## 2024-12-27 RX ORDER — FUROSEMIDE 10 MG/ML
40 INJECTION INTRAMUSCULAR; INTRAVENOUS ONCE
Status: COMPLETED | OUTPATIENT
Start: 2024-12-27 | End: 2024-12-27

## 2024-12-27 RX ADMIN — LEVETIRACETAM 500 MG: 500 TABLET, FILM COATED ORAL at 07:46

## 2024-12-27 RX ADMIN — FUROSEMIDE 40 MG: 10 INJECTION, SOLUTION INTRAMUSCULAR; INTRAVENOUS at 16:49

## 2024-12-27 RX ADMIN — SODIUM CHLORIDE, PRESERVATIVE FREE 10 ML: 5 INJECTION INTRAVENOUS at 07:46

## 2024-12-27 RX ADMIN — FUROSEMIDE 40 MG: 10 INJECTION, SOLUTION INTRAMUSCULAR; INTRAVENOUS at 06:26

## 2024-12-27 RX ADMIN — HEPARIN SODIUM 5000 UNITS: 5000 INJECTION INTRAVENOUS; SUBCUTANEOUS at 22:30

## 2024-12-27 RX ADMIN — ONDANSETRON 4 MG: 2 INJECTION INTRAMUSCULAR; INTRAVENOUS at 10:08

## 2024-12-27 RX ADMIN — HEPARIN SODIUM 5000 UNITS: 5000 INJECTION INTRAVENOUS; SUBCUTANEOUS at 05:41

## 2024-12-27 RX ADMIN — LEVETIRACETAM 500 MG: 500 TABLET, FILM COATED ORAL at 00:10

## 2024-12-27 RX ADMIN — WATER 1000 MG: 1 INJECTION INTRAMUSCULAR; INTRAVENOUS; SUBCUTANEOUS at 12:40

## 2024-12-27 RX ADMIN — LEVETIRACETAM 500 MG: 500 TABLET, FILM COATED ORAL at 20:54

## 2024-12-27 RX ADMIN — SODIUM CHLORIDE, PRESERVATIVE FREE 10 ML: 5 INJECTION INTRAVENOUS at 20:54

## 2024-12-27 RX ADMIN — HEPARIN SODIUM 5000 UNITS: 5000 INJECTION INTRAVENOUS; SUBCUTANEOUS at 14:19

## 2024-12-27 RX ADMIN — ONDANSETRON 8 MG: 4 TABLET, ORALLY DISINTEGRATING ORAL at 16:49

## 2024-12-27 RX ADMIN — ACETAMINOPHEN 650 MG: 325 TABLET ORAL at 08:01

## 2024-12-27 RX ADMIN — HEPARIN SODIUM 5000 UNITS: 5000 INJECTION INTRAVENOUS; SUBCUTANEOUS at 00:10

## 2024-12-27 RX ADMIN — AZITHROMYCIN MONOHYDRATE 250 MG: 500 INJECTION, POWDER, LYOPHILIZED, FOR SOLUTION INTRAVENOUS at 14:14

## 2024-12-27 ASSESSMENT — PAIN DESCRIPTION - FREQUENCY
FREQUENCY: INTERMITTENT
FREQUENCY: INTERMITTENT

## 2024-12-27 ASSESSMENT — PAIN SCALES - GENERAL
PAINLEVEL_OUTOF10: 4
PAINLEVEL_OUTOF10: 0
PAINLEVEL_OUTOF10: 2
PAINLEVEL_OUTOF10: 2
PAINLEVEL_OUTOF10: 3
PAINLEVEL_OUTOF10: 0

## 2024-12-27 ASSESSMENT — PAIN DESCRIPTION - PAIN TYPE
TYPE: ACUTE PAIN
TYPE: ACUTE PAIN

## 2024-12-27 ASSESSMENT — PAIN - FUNCTIONAL ASSESSMENT
PAIN_FUNCTIONAL_ASSESSMENT: ACTIVITIES ARE NOT PREVENTED
PAIN_FUNCTIONAL_ASSESSMENT: ACTIVITIES ARE NOT PREVENTED

## 2024-12-27 ASSESSMENT — PAIN DESCRIPTION - ORIENTATION
ORIENTATION: MID
ORIENTATION: MID

## 2024-12-27 ASSESSMENT — PAIN DESCRIPTION - ONSET
ONSET: ON-GOING
ONSET: ON-GOING

## 2024-12-27 ASSESSMENT — PAIN DESCRIPTION - LOCATION
LOCATION: ABDOMEN
LOCATION: ABDOMEN

## 2024-12-27 ASSESSMENT — PAIN DESCRIPTION - DESCRIPTORS
DESCRIPTORS: DISCOMFORT
DESCRIPTORS: DISCOMFORT

## 2024-12-27 NOTE — CONSULTS
Consult received.  Labs and notes were reviewed.  Case was discussed with the staff.    Full note to follow.    Thanks  Nephrology  [address] [place] [address] [id number]  [place] [address]  Office: [phone]  Cell: [phone]  Fax: [phone]

## 2024-12-27 NOTE — ED NOTES
Patient Name: Jonna Erickson  : 1935 89 y.o.  MRN: [id number]  ED Room #: B21/B21-21     Chief complaint:   Chief Complaint   Patient presents with    Shortness of Breath     Pt states they have increased SOB for the last couple weeks. Per EMS pt O2 saturation was 80% on RA when they arrived. Pt placed on 2L NC by EMS and stated O2 came up to 97%     Hospital Problem/Diagnosis:   Hospital Problems             Last Modified POA    * (Principal) CATHRYN (acute kidney injury) ([facility]) 2024 Yes         O2 Flow Rate:O2 Device: None (Room air) O2 Flow Rate (L/min): 2 L/min (if applicable)  Cardiac Rhythm:   (if applicable)  Active LDA's:   Peripheral IV 24 Left Forearm (Active)   Site Assessment Clean, dry & intact 24   Line Status Blood return noted 24            How does patient ambulate? Unknown, did not assess in the Emergency Department    2. How does patient take pills? Whole with Water; pt required lots of water for pills    3. Is patient alert? Alert    4. Is patient oriented? To Person, To Place, To Time, To Situation, and Follows Commands    5.   Patient arrived from:  home  Facility Name: ___________________________________________    6. If patient is disoriented or from a Skill Nursing Facility has family been notified of admission? No    7. Patient belongings? Belongings: Dentures and Clothing    Disposition of belongings? Kept with Patient     8. Any specific patient or family belongings/needs/dynamics?   a.     9. Miscellaneous comments/pending orders?  a.       If there are any additional questions please reach out to the Emergency Department.      Norma Allen, LOUISE  [phone]

## 2024-12-27 NOTE — CONSULTS
[facility]    Consult Note      Requesting Physician:  Tamika    CHIEF COMPLAINT:  shortness of breath      HISTORY OF PRESENT ILLNESS:    Ms. Erickson  is a 89 y.o. female we are seeing in consultation for Metastatic Melanoma. She is followed by Dr. Cheko Man as an outpatient. Last seen in our office on 12/14/24 for an urgent visit for N&V which had resolved at the time of the visit. At that time, the patient's son and DIL were interested in palliative services however the patient and her  declined.     Her daughter Negra called in to triage yesterday with c/o hypoxia in the 80s with dips in to the 70s. [facility] recommended the patient go to the ED. Another daughter then called in to the office to try to make a same day appointment with Dr. Man instead. The patient was refusing to go to the ED.     CTPA with a moderate to large layering right-sided pleural effusion resulting in partial collapse of the dependent portion right lower lobe. There is a small to moderate size  layering left pleural effusion with some groundglass atelectasis within the dependent portions of the left upper lobe and superior segment of the left lower lobe.      Today she is seen with her daughter and  at bedside. She does not open her eyes for me during my exam but does answer questions. Daughter concerned about how they will take her home given how deconditioned she is. Additionally she is concerned about edema and weeping from her extremities. She does not appear to have any open wounds       Past Medical History:  No past medical history on file.    Past Surgical History:  Past Surgical History:   Procedure Laterality Date    CT NEEDLE BIOPSY LUNG PERCUTANEOUS W IMAGING GUIDANCE  11/19/2024    CT NEEDLE BIOPSY LUNG PERCUTANEOUS 11/19/2024 [facility] CT SCAN    KIDNEY TRANSPLANT         Current  3.66* 3.74* 4.63   HGB 11.4* 12.9 10.3* 10.5* 13.2   HCT 34.9* 39.6 32.3* 33.0* 41.0   MCV 90.1 90.0 88.1 88.3 88.6   MCH 29.5 29.3 28.2 28.2 28.6   MCHC 32.8 32.5 32.0 31.9 32.2   RDW 20.1* 19.9* 17.1* 16.8* 17.2*    [phone]        LDH:No results for input(s): \"LDH\" in the last 720 hours.      Radiology Review:  CTA PULMONARY W CONTRAST  Narrative: CT ANGIOGRAPHY OF THE CHEST:    CLINICAL HISTORY: Shortness of breath    TECHNIQUE: Spiral CT images of the chest were obtained during the intravenous administration of  contrast. Maximum intensity projection (M.I.P.) reconstructions of the image data set were performed. Up to date CT equipment and radiation dose reduction   techniques were employed.    COMPARISON: Chest CT dated 11/8/2024    FINDINGS: There is no evidence of any pulmonary embolism. There is some air identified within the nondependent portion of the portal vein which is attributed to be iatrogenic. Thoracic aorta is without aneurysm or dissection. There is a small pericardial   effusion.    No axillary, mediastinal or hilar lymphadenopathy is identified.    There is a 8.6 x 5.5 cm mass identified within the collapsed left lower lobe. There is a moderate to large layering right-sided pleural effusion resulting in partial collapse of the dependent portion right lower lobe. There is a small to moderate size   layering left pleural effusion with some groundglass atelectasis within the dependent portions of the left upper lobe and superior segment of the left lower lobe.    There are multiple bilateral renal cysts which are partially visualized. Correlate for polycystic kidney disease. There is a stable 2.8 cm cyst identified within the posterior superior segment of the right lobe of liver. There is nonobstructing   left-sided nephrolithiasis. There is anasarca identified within the dependent subcutaneous tissues of the thorax.    No osteolytic or osteoblastic lesions are identified.

## 2024-12-27 NOTE — H&P
Internal Medicine H&P    Date:   2024   Patient:  Jonna Erickson   :   1935     CC:  Shortness of breath       Source of HPI: Patient, chart review, and family    Subjective     HPI:   Ms. Jonna Erickson is a 89 y.o. 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    Patient was and oriented person, time, and location but is unable to provide details of today's events.  Contacted  Hamzah Erickson but he could not remember what happened earlier today.  Contacted daughter Negra Maher who provided collateral history.  She states that over the last couple days the patient has become increasingly confused with shortness of breath. The patients daughter called EMS who reported an O2 sat of 80%. She improved to the mid/high 90s on 2L NC.  On examination, the patient denies pain. She denies nausea and vomiting. She is tolerating a regular diet but endorses decreased appetite. She is having regular bowel movements and voiding spontaneously. Denies dysuria. She denies fever and chills. She denies chest pain. She also denies recent weight loss. Code status discussed. Patient wants to be full code.    ED Course:  On arrival to the ED, she was found to afebrile temp 97.8, intermittently hypertensive SBP 200s, heart rate 60s, satting in the mid 90s on 2 L nasal cannula  Labs were significant for: BUN 66,creatinine 1.8 (baseline 0.8), proBNP 8964, troponin 73, albumin 2.8  EKG showing normal sinus rhythm, left axis deviation, left bundle branch block.  QTc 455.  Imaging ,  CTA pulmonary embolism with contrast shows no evidence of pulmonary embolism.  Left lower lobe mass.  Worrisome for primary lung cancer.  Moderate to large layering right-sided pleural effusion with adjacent atelectasis.  Small to moderate size layering left-sided pleural  Gita Sanchez MD Kenneth Frank Pereira, DO, PGY-1  Internal Medicine Resident  Contact via [employer]

## 2024-12-27 NOTE — PLAN OF CARE
Problem: Discharge Planning  Goal: Discharge to home or other facility with appropriate resources  Outcome: Progressing  Flowsheets (Taken 12/27/2024 1752)  Discharge to home or other facility with appropriate resources:   Identify barriers to discharge with patient and caregiver   Arrange for needed discharge resources and transportation as appropriate   Identify discharge learning needs (meds, wound care, etc)   Arrange for interpreters to assist at discharge as needed   Refer to discharge planning if patient needs post-hospital services based on physician order or complex needs related to functional status, cognitive ability or social support system  Note: Barriers to discharge identified and addressed. Learning needs and resources assessed. Discharge planning updated according to treatment team     Problem: Safety - Adult  Goal: Free from fall injury  Outcome: Progressing  Flowsheets (Taken 12/27/2024 1808)  Free From Fall Injury: Instruct family/caregiver on patient safety  Note: Pt free from injury or falls at this time, fall precautions in place, bed in low position, side rail up x2, Stoll Fall Risk: High (45 and higher), bed alarm on, reoriented to room and call light, reminded not to get up without assistance, call light in reach, will continue to monitor. Pt verbalizes understanding of fall risk procedures.       Problem: Chronic Conditions and Co-morbidities  Goal: Patient's chronic conditions and co-morbidity symptoms are monitored and maintained or improved  Outcome: Progressing  Flowsheets (Taken 12/27/2024 1808)  Care Plan - Patient's Chronic Conditions and Co-Morbidity Symptoms are Monitored and Maintained or Improved:   Collaborate with multidisciplinary team to address chronic and comorbid conditions and prevent exacerbation or deterioration   Monitor and assess patient's chronic conditions and comorbid symptoms for stability, deterioration, or improvement   Update acute care plan with

## 2024-12-27 NOTE — CONSULTS
Ph: [phone], Fax: [phone]           [facility]               Reason for admission:    CATHRYN   Bilateral pleural effusion       Brief Summary :     Jonna Erickson is being seen by nephrology for CATHRYN.      Interval History and plan:      Patient seen at bedside having breakfast. Reports some epigastric abdominal discomfort.     Plan:  -Urine protein and creatinine  -Diuresis with IV lasix   -Renal ultrasound   -Send labs for donor specific antibodies, BK virus  -RFP daily, monitor creatinine closely   -Hold immunosuppressant for now   -Check tacrolimus level                      Assessment :   Acute Kidney Injury - unclear etiology   Transplant kidney in 2007 - on sirolimus, follows with Transplant nephrologist, Dr Marin Kowalski at   -Presented with a creatinine of 1.8 from a baseline of 0.8 on 12/05. Received contrast after the lab draw. Creatinine is 1.9 today. Recent CT abdomen/pelvis on 12/05/2024 revealed grossly hydronephrotic transplant  kidney with dilation of the renal pelvis and ureter. Repeat imaging 12/05 showed the renal pelvis and ureter are surrounded by either wall thickening or fluid defined by the walls. She has decreased appetite and PO intake. Etiology could be obstructive uropathy or prerenal. Acute rejection is considered but less likely. Recent contrast could further worsen CATHRYN.   Received 40 mg IV lasix on admission and had made only 350 ml urine so far. She is not on diuretics at home. Per son, she has been getting her sirolimus at home.   -Bladder scan Q shift   -Can consider echo given possible underlying heart failure with bilateral leg swelling, orthopnea and elevated BNP.  -Daily RFP   -Strict Is/Os  -Avoid NSAIDs (Ibuprofen, Aleve, Motrin, Naproxen, Toradol etc), Fleet enemas, IV contrast Dye and other nephrotoxins!     Possible UTI   -UA notable for proteinuria, trace LE, 4+ bacteria, 6-9 WBC.   -reports some abdominal discomfort, denies dysuria but given  reports of confusion in the setting of transplanted kidney, reasonable to treat empirically  -Urine culture sent   -Start rocephin 1g q24 hr    Hypertensive urgency   -BP elevated to 182/82, highest reading 202/84 on admission. Patient does not take any medications at home. BP still elevated today 151/79   -Per son, BP is usually normal at home and she has white coat hypertension  -Monitor BP closely, if continue to be elevated can start on low dose amlodipine     Bilateral pleural effusions, R > L    -could be malignant, however appears to not have significantly increased from last imaging. She is on room air and does not appear to be in respiratory distress.   -Pulmunology consulted        [facility] would like to thank Marni Whatley MD   for opportunity to serve this patient      Please call with questions at-   24 Hrs Answering service [phone] or  7 am- 5 pm via [employer] or cell phone       HPI :     Jonna Erickson is a 89 y.o. 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 Daughter called EMS for increasing SOB and confusion for the past few days. EMS noted O2 sats at 80% which increased to the mid 90s on 2L supplementation.     Patient is known to me from her recent ICU admission earlier this month. She is unable to contribute much to the history. She reports she sometimes gets short of breath when she is laying flat in bed, sleeps with 2 thin pillows at home which is not new, unable to tell me if her leg swelling is getting worse, but looks slightly worse from her recent admission. She reports she eats three meals at home, unable to tell me what quantity but drinks a protein drink in the mornings. Denies use of NSAIDS, she is on protonix 40 mg daily at home,

## 2024-12-27 NOTE — CONSULTS
CC:  hypoxemia     Jonna Erickson is referred by Dr. Marni Whatley for evaluation of hypoxemia and pleural effusion.  The patient does not render any helpful history, indicates she is tired of answering questions.  Family reports that she has been increasingly confused, has had some shortness of breath.  [employer] was called and found pulse oximetry measurement of O2Hgb 80%.  She has a recent history of melanoma, with a large left lower lobe mass biopsy-proven, and 2 brain lesions.  She had gamma knife treatment to the brain lesions last week.  She has right pleural effusion, first seen on chest x-ray on 11/8/2024.  She has not received systemic therapy for metastatic melanoma.  There are no actionable mutations.    Past medical history: Polycystic kidney disease, status post kidney transplant.    Social history: Patient lives in her home shared with her .  She is a lifelong non-smoker.  Alcohol use uncertain.    Review of systems cannot be obtained from patient who does not answer questions.    Physical examination reveals a woman who appears somewhat younger than her stated age.  She is sitting up in a chair, in no acute distress.  She acknowledges examiner, follows directives for examination.  Pulse 70 and regular.  /79.  Respirations 18.  O2 saturation 94%, on finger pulse oximetry with O2 at 2 L/min.  After 20 minutes on room air, O2 saturation 94% on room air, using ear probe oximeter..  Afebrile  HEENT: Eyes ears nose normal.  Mucous membranes are moist.  Neck: No lymphadenopathy or thyromegaly.  Chest: Dullness and decreased breath sounds at right lung base.  No adventitious breath sounds    Cardiovascular: Radial pulses 1+ bilaterally.  S1, S2 normal.  Abdomen: Unable to examine sitting up in chair.  Extremities: Cyanosis of most fingertips.  No peripheral edema.    Chest x-ray and CT scan from admission show right pleural effusion again present, moderate in size.  There has been a slight increase

## 2024-12-27 NOTE — CONSULTS
General: She is not in acute distress.     Appearance: She is ill-appearing.   Cardiovascular:      Rate and Rhythm: Normal rate and regular rhythm.      Heart sounds: Normal heart sounds.   Pulmonary:      Breath sounds: Normal breath sounds.   Abdominal:      General: Bowel sounds are normal.      Palpations: Abdomen is soft.   Musculoskeletal:      Right lower leg: Edema present.      Left lower leg: Edema present.   Skin:     General: Skin is warm and dry.   Neurological:      Mental Status: She is oriented to person, place, and time.          Palliative Performance Scale:  [] 60% Ambulation reduced; Significant disease; Can't do hobbies/housework; intake normal or reduced; occasional assist; LOC full/confusion  [] 50% Mainly sit/lie; Extensive disease; Can't do any work; Considerable assist; intake normal  Or reduced; LOC full/confusion  [x] 40% Mainly in bed; Extensive disease; Mainly assist; intake normal or reduced; occasional assist; LOC full/confusion  [] 30% Bed Bound; Extensive disease; Total care; intake reduced; LOC full/confusion  [] 20% Bed Bound; Extensive disease; Total care; intake minimal; Drowsy/coma  [] 10% Bed Bound; Extensive disease; Total care; Mouth care only; Drowsy/coma  [] 0% Death    PPS: 40    Vitals:    BP (!) 160/76   Pulse 71   Temp 97 °F (36.1 °C) (Oral)   Resp 16   Ht 1.676 m (5' 6\")   Wt 65.3 kg (143 lb 15.4 oz)   SpO2 95%   BMI 23.24 kg/m²     Labs:    BMP:   Recent Labs     12/26/24 1738 12/27/24  0523   * 135*   K 4.6 4.0    103   CO2 21 21   BUN 66* 67*   CREATININE 1.8* 1.9*   GLUCOSE 137* 88   PHOS  --  3.9     CBC:   Recent Labs     12/26/24  1738 12/27/24  0523   WBC 9.2 10.3   HGB 12.9 11.4*   HCT 39.6 34.9*    174       LFT's:   Recent Labs     12/26/24 1738   AST 47*   ALT 20   BILITOT 0.6   ALKPHOS 70     Troponin: No results for input(s): \"TROPONINI\" in the last 72 hours.  BNP: No results for input(s): \"BNP\" in the last 72  hours.  ABGs: No results for input(s): \"PHART\", \"VNQ3DCX\", \"PO2ART\" in the last 72 hours.  INR: No results for input(s): \"INR\" in the last 72 hours.    U/A:No results for input(s): \"NITRITE\", \"COLORU\", \"PHUR\", \"LABCAST\", \"WBCUA\", \"RBCUA\", \"MUCUS\", \"TRICHOMONAS\", \"YEAST\", \"BACTERIA\", \"CLARITYU\", \"SPECGRAV\", \"LEUKOCYTESUR\", \"UROBILINOGEN\", \"BILIRUBINUR\", \"BLOODU\", \"GLUCOSEU\", \"AMORPHOUS\" in the last 72 hours.    Invalid input(s): \"KETONESU\"    CTA PULMONARY W CONTRAST   Final Result   1. No evidence of any pulmonary embolism.   2. Left lower lobe mass. This is worrisome for a primary lung cancer. Pathologic correlation is recommended.   3. Moderate to large layering right-sided pleural effusion with adjacent atelectasis.   4. Small to moderate size layering left-sided pleural effusion.   5. Polycystic kidneys.      Electronically signed by Moshe Coelho MD      XR CHEST (2 VW)   Final Result   Right pleural effusion.   Increasing right basilar infiltrate.      Electronically signed by Santiago Welsh            Conclusion/Time spent:         Recommendations see above    Time spent with patient and/or family: 20  Time reviewing records: 10 min   Time communicating with staff: 5 min     A total of 35 minutes spent with the patient and family on unit greater than 50% in counseling regarding palliative care and in goals of care for the patient.    Thank you to Dr. Whatley for this consultation. We will continue to follow Ms. Erickson's care as needed.      Gisele Granados [facility]  Inpatient Palliative Care  [phone]

## 2024-12-27 NOTE — PROGRESS NOTES
Internal Medicine Progress Note    Patient Name: Jonna Erickson   Patient : 1935   Date: 2024   Admit Date: 2024     CC: Shortness of Breath (Pt states they have increased SOB for the last couple weeks. Per EMS pt O2 saturation was 80% on RA when they arrived. Pt placed on 2L NC by EMS and stated O2 came up to 97%)       Interval History     - Pt sitting comfortably in the chair. Left arm weeping. Mild abdominal pain and N/V.   - med/onc, pulm and palliative consult  - Pt has severe anasarca, will attempt diuresis today  - Not on oxygen and does not seem to be in respiratory distress    ROS   Review of Systems   Respiratory:  Positive for cough and shortness of breath.    Cardiovascular:  Positive for leg swelling. Negative for chest pain.   Gastrointestinal:  Positive for abdominal pain and nausea.          Objective     I/Os:  I/O last 3 completed shifts:  In: -   Out: 150 [Urine:150]      Vital Signs:  Patient Vitals for the past 8 hrs:   BP Temp Temp src Pulse Resp SpO2 Weight   24 0626 (!) 152/71 -- -- -- -- -- --   24 0600 -- -- -- 61 -- -- --   24 0541 -- -- -- -- -- -- 65.3 kg (143 lb 15.4 oz)   24 0400 -- -- -- 62 -- -- --   24 0345 (!) 166/72 96.9 °F (36.1 °C) Oral 60 16 96 % --   24 0200 -- -- -- 60 -- -- --   24 0000 -- -- -- 65 -- -- --       Physical Exam:  Physical Exam  Vitals reviewed.   Constitutional:       General: She is not in acute distress.     Appearance: She is ill-appearing.   HENT:      Head: Normocephalic and atraumatic.      Comments: Periorbital edema     Right Ear: External ear normal.      Left Ear: External ear normal.      Nose: Nose normal.      Mouth/Throat:      Mouth: Mucous membranes are dry.      Pharynx: Oropharynx is clear.   Eyes:      Pupils: Pupils are equal, round, and reactive to light.   Cardiovascular:      Rate and Rhythm: Normal rate and regular rhythm.      Pulses: Normal pulses.      Heart sounds:  effusion right>left on imaging. Patient was experiencing shortness of breath with pro-BNP elevated over 8K (up from 1K). Received 5 days of gamma knife recently.   -Continue Keppra  -x1 dose 40mg lasix, monitor Cr and continue diuresis of Cr doesn't increase  -Oncology consulted  -Pulmonology consulted   - Pleuracentesis likely not indicated as her respiratory status is stable and unchanged from baseline.   - Infectious workup pending  - Echo pending  - Treating for possible PNA, ceftriaxone and azithromycin   - Palliative on board to help with goals of care and ensuring POA is completed.      CATHRYN  Polycystic kidney disease  Possible UTI  S/p kidney transplant 2007  Patient takes sirolimus but was stopped recently. Patient was told to hold them while getting radiation treatments and family thinks she is supposed to restart them soon. Will follow up Heme Onc recommendations as to restarting. CATHRYN likely cardiorenal given fluid overload state. Will monitor response to lasix. Rejection is also always a concern given she has been off her sirolimus.  -Cr 1.8 on admission, baseline 0.8  -Avoid nephrotoxic agents  -Trend Cr on daily RFP  - Ceftriaxone  -Consult nephrology   -Urine protein and creatinine  -Diuresis with IV lasix   -Renal ultrasound   -Send labs for donor specific antibodies, BK virus  -RFP daily, monitor creatinine closely   -Hold immunosuppressant for now   -Check tacrolimus level       DVT PPx: Heparin   Diet:  ADULT DIET; Regular   Code status:  Full Code     ELOS: 3  Barriers to discharge:   Disposition  - Preadmission: Home  - Current: PCU  - Upon discharge: ?      Will discuss with attending physician Marni Anne MD.     Santiago Singletary DO, PGY-1  Internal Medicine Resident  Contact via [employer]

## 2024-12-27 NOTE — CONSULTS
Clinical Pharmacy Consult Note  Medication History     Admit Date: 12/26/2024    Pharmacy consulted to verify home medication list by Dr. Lundberg.    List of current medications patient is taking is complete. Home Medication list in EPIC updated to reflect changes noted below.    Source of information:     Review of Rx dispense history ([facility]-complete dispense report in Epic)   Discussion with patient's daughter and patient's  who is physician      Patient's home pharmacy: [facility]/pharmacy #[id number] - [facility] [address] - P [phone] - F [phone]      CHANGES TO HOME MEDICATION LIST:    REMOVED:    Tacrolimus  (no fill hx, has not taken in a while, currently on sirolimus)  ADDED   Prednisone -   Rx filled 11/26 for 30-day supply of 10 mg daily.  Per , pt stopped this when dexamethasone started.  On 12/26 prior to admission, pt took one dose of 10 mg at home, after last dose of dexamethasone taken.     DOSE or FREQUENCY CHANGE  none  OTHER NOTES:   Oxycodone-- added to list since pt has supply at home. Filled Rx for 5 mg #120/30d on 12/14.  Per , has not taken any doses  Dexamethasone-- Rx for 4 mg BID filled 12/19.  Was to take during gamma knife treatment.   Was tapered past week:  12/23 and 12/25, took 4 mg in am only.  On 12/25 and 12/26, took 2 mg (1/2 of 4 mg tab) in morning to end taper.   Keppra - per daughter, was instructed to continue 500 mg BID through 1/3/25    Current Outpatient Medications   Medication Instructions    dexAMETHasone (DECADRON) 4 mg, Oral, EVERY MORNING    levETIRAcetam (KEPPRA) 500 mg, Oral, 2 TIMES DAILY    ondansetron (ZOFRAN-ODT) 4 mg, Oral, 3 TIMES DAILY PRN    oxyCODONE (ROXICODONE) 5 mg, Oral, EVERY 6 HOURS PRN, Rx filled 12/14/24 - never took     pantoprazole (PROTONIX) 40 mg, Oral, DAILY BEFORE BREAKFAST    predniSONE (DELTASONE) 10 mg, Oral, DAILY, Was off while taking dexamethasone.  Did take one dose at home on 12/26 after weaning  off dexamethasone    sirolimus (RAPAMUNE) 1 mg, Oral, DAILY, On Monday, Wed, Friday       Thank you for consulting pharmacy,  Please call with any questions    Atiya Romero RPh PharmD, [facility]   Inpatient pharmacy [phone]

## 2024-12-27 NOTE — PROGRESS NOTES
Physical Therapy  Facility/Department: [address]  Physical Therapy Initial Assessment    Name: Jonna Erickson  : 1935  MRN: [id number]  Date of Service: 2024    Discharge Recommendations:  Subacute/Skilled Nursing Facility   PT Equipment Recommendations  Other: defer      Patient Diagnosis(es): The primary encounter diagnosis was Pleural effusion. A diagnosis of Shortness of breath was also pertinent to this visit.  Past Medical History:  has no past medical history on file.  Past Surgical History:  has a past surgical history that includes Kidney transplant and CT NEEDLE BIOPSY LUNG PERCUTANEOUS (2024).    Assessment  Body Structures, Functions, Activity Limitations Requiring Skilled Therapeutic Intervention: Decreased functional mobility ;Decreased tolerance to work activity;Decreased safe awareness;Decreased cognition;Decreased endurance;Decreased balance;Decreased posture  Assessment: Pt is 89 y.o. with diagnosis of SOB presenting with decreased functional mobility.  Pt is currently requiring assistx1-2 for all functional mobility, which is a decline from reported baseline.  Safety concerns for d/c home due to pt requiring assist for all functional mobility, pt unable to tolerate household ambulation or stairs necessary to enter/exit home, making her a high fall risk.  Pt will benefit from skilled therapy to maximize safety and independence.  Treatment Diagnosis: decreased functional mobility due to SOB  Therapy Prognosis: Fair  Decision Making: Medium Complexity  Requires PT Follow-Up: Yes  Activity Tolerance  Activity Tolerance: Patient limited by fatigue;Patient limited by endurance  Activity Tolerance Comments: pt frequently closing eyes and falling asleep, RN notified    Plan  Physical Therapy Plan  General Plan:  (2-5)  Current Treatment Recommendations: Strengthening, ROM, Balance training, Functional mobility training, Transfer training, Endurance training, Gait training, Stair

## 2024-12-27 NOTE — PROGRESS NOTES
Occupational Therapy  Facility/Department: [facility]  Occupational Therapy Initial Assessment and Treatment    Name: Jonna Erickson  : 1935  MRN: [id number]  Date of Service: 2024    Discharge Recommendations:  Subacute/Skilled Nursing Facility  OT Equipment Recommendations  Equipment Needed: No  Other: defer to next level of care       Patient Diagnosis(es): The primary encounter diagnosis was Pleural effusion. A diagnosis of Shortness of breath was also pertinent to this visit.  Past Medical History:  has no past medical history on file.  Past Surgical History:  has a past surgical history that includes Kidney transplant and CT NEEDLE BIOPSY LUNG PERCUTANEOUS (2024).    Treatment Diagnosis: Impaired ADL status; Impaired functional mobility      Assessment  Performance deficits / Impairments: Decreased functional mobility ;Decreased ADL status;Decreased strength;Decreased safe awareness;Decreased cognition;Decreased endurance;Decreased balance;Decreased high-level IADLs;Decreased coordination;Decreased fine motor control  Assessment: Pt from home with spouse and reportedly independent with ADLs at baseline. Pt functioning well below baseline this date as she was max A for bed mobility and min A of 2 for transfers. Pt dependent for pericare at bed level after instance of urinary incontinence and required max A to adjust footwear. Pt with fluctuating alertness throughout session and requried max VCs for initiation and sequencing of all ADLs. Pt also demo'd decreased coordination when attempting to feed herself. Pt is a high fall risk. Pt would benefit from additional OT to improve overall activity tolerance and increase safety during functional mobility and ADLs. Recommend SNF upon discharge to maximize safety. Will continue to follow with acute OT  Treatment Diagnosis: Impaired ADL status; Impaired functional mobility  Prognosis: Fair  Decision Making: Medium Complexity  REQUIRES OT FOLLOW-UP:

## 2024-12-27 NOTE — PROGRESS NOTES
4 Eyes Skin Assessment     NAME:  Jonna Erickson  YOB: 1935  MEDICAL RECORD NUMBER:  [id number]    The patient is being assessed for  Admission    I agree that at least one RN has performed a thorough Head to Toe Skin Assessment on the patient. ALL assessment sites listed below have been assessed.      Areas assessed by both nurses:    Head, Face, Ears, Shoulders, Back, Chest, Arms, Elbows, Hands, Sacrum. Buttock, Coccyx, Ischium, Legs. Feet and Heels, and Under Medical Devices         Scattered ecchymosis to BLE  Abrasion/scab to top of coccyx  Weeping edema to BUE  Blanchable redness/discoloration to bilat feet/toes  Dry/intact callus to bilat balls of feet and heels  Callus/scabs to multiple toes to bilat feet    Does the Patient have a Wound? No noted wound(s)       Eric Prevention initiated by RN: Yes  Wound Care Orders initiated by RN: No    Pressure Injury (Stage 3,4, Unstageable, DTI, NWPT, and Complex wounds) if present, place Wound referral order by RN under : No    New Ostomies, if present place, Ostomy referral order under : No     Nurse 1 eSignature: Electronically signed by Mary Mcgill RN on 12/27/24 at 2:00 AM EST    **SHARE this note so that the co-signing nurse can place an eSignature**    Nurse 2 eSignature: Electronically signed by Olivia Hassan RN on 12/27/24 at 3:15 AM EST

## 2024-12-28 ENCOUNTER — APPOINTMENT (OUTPATIENT)
Dept: ULTRASOUND IMAGING | Age: 89
DRG: 699 | End: 2024-12-28
Payer: MEDICARE

## 2024-12-28 LAB
ALBUMIN SERPL-MCNC: 2.6 G/DL (ref 3.4–5)
ANION GAP SERPL CALCULATED.3IONS-SCNC: 16 MMOL/L (ref 3–16)
BASOPHILS # BLD: 0 K/UL (ref 0–0.2)
BASOPHILS NFR BLD: 0.1 %
BUN SERPL-MCNC: 67 MG/DL (ref 7–20)
CALCIUM SERPL-MCNC: 8.2 MG/DL (ref 8.3–10.6)
CHLORIDE SERPL-SCNC: 101 MMOL/L (ref 99–110)
CO2 SERPL-SCNC: 18 MMOL/L (ref 21–32)
CREAT SERPL-MCNC: 1.9 MG/DL (ref 0.6–1.2)
DEPRECATED RDW RBC AUTO: 20.7 % (ref 12.4–15.4)
EOSINOPHIL # BLD: 0.1 K/UL (ref 0–0.6)
EOSINOPHIL NFR BLD: 0.6 %
GFR SERPLBLD CREATININE-BSD FMLA CKD-EPI: 25 ML/MIN/{1.73_M2}
GLUCOSE BLD-MCNC: 141 MG/DL (ref 70–99)
GLUCOSE SERPL-MCNC: 63 MG/DL (ref 70–99)
HCT VFR BLD AUTO: 37.5 % (ref 36–48)
HGB BLD-MCNC: 11.9 G/DL (ref 12–16)
LYMPHOCYTES # BLD: 1.1 K/UL (ref 1–5.1)
LYMPHOCYTES NFR BLD: 11.2 %
MAGNESIUM SERPL-MCNC: 2.11 MG/DL (ref 1.8–2.4)
MCH RBC QN AUTO: 29.2 PG (ref 26–34)
MCHC RBC AUTO-ENTMCNC: 31.9 G/DL (ref 31–36)
MCV RBC AUTO: 91.6 FL (ref 80–100)
MONOCYTES # BLD: 0.6 K/UL (ref 0–1.3)
MONOCYTES NFR BLD: 6.1 %
NEUTROPHILS # BLD: 8.1 K/UL (ref 1.7–7.7)
NEUTROPHILS NFR BLD: 82 %
PERFORMED ON: ABNORMAL
PHOSPHATE SERPL-MCNC: 4.8 MG/DL (ref 2.5–4.9)
PLATELET # BLD AUTO: 160 K/UL (ref 135–450)
PMV BLD AUTO: 6.8 FL (ref 5–10.5)
POTASSIUM SERPL-SCNC: 4.2 MMOL/L (ref 3.5–5.1)
RBC # BLD AUTO: 4.09 M/UL (ref 4–5.2)
SODIUM SERPL-SCNC: 135 MMOL/L (ref 136–145)
WBC # BLD AUTO: 9.9 K/UL (ref 4–11)

## 2024-12-28 PROCEDURE — 85025 COMPLETE CBC W/AUTO DIFF WBC: CPT

## 2024-12-28 PROCEDURE — 76770 US EXAM ABDO BACK WALL COMP: CPT

## 2024-12-28 PROCEDURE — 2500000003 HC RX 250 WO HCPCS

## 2024-12-28 PROCEDURE — 83735 ASSAY OF MAGNESIUM: CPT

## 2024-12-28 PROCEDURE — 2580000003 HC RX 258

## 2024-12-28 PROCEDURE — 2060000000 HC ICU INTERMEDIATE R&B

## 2024-12-28 PROCEDURE — 6360000002 HC RX W HCPCS: Performed by: INTERNAL MEDICINE

## 2024-12-28 PROCEDURE — 80069 RENAL FUNCTION PANEL: CPT

## 2024-12-28 PROCEDURE — 6360000002 HC RX W HCPCS

## 2024-12-28 PROCEDURE — 6370000000 HC RX 637 (ALT 250 FOR IP)

## 2024-12-28 PROCEDURE — 87799 DETECT AGENT NOS DNA QUANT: CPT

## 2024-12-28 PROCEDURE — 51798 US URINE CAPACITY MEASURE: CPT

## 2024-12-28 PROCEDURE — 36415 COLL VENOUS BLD VENIPUNCTURE: CPT

## 2024-12-28 RX ORDER — FUROSEMIDE 10 MG/ML
40 INJECTION INTRAMUSCULAR; INTRAVENOUS ONCE
Status: COMPLETED | OUTPATIENT
Start: 2024-12-28 | End: 2024-12-28

## 2024-12-28 RX ADMIN — LEVETIRACETAM 500 MG: 500 TABLET, FILM COATED ORAL at 08:06

## 2024-12-28 RX ADMIN — FUROSEMIDE 40 MG: 10 INJECTION, SOLUTION INTRAMUSCULAR; INTRAVENOUS at 10:13

## 2024-12-28 RX ADMIN — SODIUM CHLORIDE, PRESERVATIVE FREE 10 ML: 5 INJECTION INTRAVENOUS at 20:56

## 2024-12-28 RX ADMIN — HEPARIN SODIUM 5000 UNITS: 5000 INJECTION INTRAVENOUS; SUBCUTANEOUS at 20:59

## 2024-12-28 RX ADMIN — WATER 1000 MG: 1 INJECTION INTRAMUSCULAR; INTRAVENOUS; SUBCUTANEOUS at 13:37

## 2024-12-28 RX ADMIN — AZITHROMYCIN MONOHYDRATE 250 MG: 500 INJECTION, POWDER, LYOPHILIZED, FOR SOLUTION INTRAVENOUS at 15:18

## 2024-12-28 RX ADMIN — LEVETIRACETAM 500 MG: 500 TABLET, FILM COATED ORAL at 20:59

## 2024-12-28 RX ADMIN — HEPARIN SODIUM 5000 UNITS: 5000 INJECTION INTRAVENOUS; SUBCUTANEOUS at 13:37

## 2024-12-28 RX ADMIN — SODIUM CHLORIDE, PRESERVATIVE FREE 10 ML: 5 INJECTION INTRAVENOUS at 08:06

## 2024-12-28 RX ADMIN — HEPARIN SODIUM 5000 UNITS: 5000 INJECTION INTRAVENOUS; SUBCUTANEOUS at 05:41

## 2024-12-28 ASSESSMENT — PAIN SCALES - GENERAL
PAINLEVEL_OUTOF10: 0

## 2024-12-28 NOTE — PLAN OF CARE
Problem: Discharge Planning  Goal: Discharge to home or other facility with appropriate resources  12/28/2024 0416 by Mary Mcgill, RN  Outcome: Progressing  Flowsheets (Taken 12/27/2024 1759 by Earnest Rowland, RN)  Discharge to home or other facility with appropriate resources:   Identify barriers to discharge with patient and caregiver   Arrange for needed discharge resources and transportation as appropriate   Identify discharge learning needs (meds, wound care, etc)   Arrange for interpreters to assist at discharge as needed   Refer to discharge planning if patient needs post-hospital services based on physician order or complex needs related to functional status, cognitive ability or social support system     Problem: Safety - Adult  Goal: Free from fall injury  12/28/2024 0416 by Mary Mcgill RN  Outcome: Progressing  Flowsheets (Taken 12/27/2024 1808 by Earnest Rowland, RN)  Free From Fall Injury: Instruct family/caregiver on patient safety     Problem: ABCDS Injury Assessment  Goal: Absence of physical injury  Outcome: Progressing  Flowsheets (Taken 12/28/2024 0416)  Absence of Physical Injury: Implement safety measures based on patient assessment     Problem: Chronic Conditions and Co-morbidities  Goal: Patient's chronic conditions and co-morbidity symptoms are monitored and maintained or improved  12/28/2024 0416 by Mary Mcgill, RN  Outcome: Progressing  Flowsheets (Taken 12/27/2024 1808 by Earnest Rowland, RN)  Care Plan - Patient's Chronic Conditions and Co-Morbidity Symptoms are Monitored and Maintained or Improved:   Collaborate with multidisciplinary team to address chronic and comorbid conditions and prevent exacerbation or deterioration   Monitor and assess patient's chronic conditions and comorbid symptoms for stability, deterioration, or improvement   Update acute care plan with appropriate goals if chronic or comorbid symptoms are exacerbated and prevent overall improvement and  discharge     Problem: Cardiovascular - Adult  Goal: Maintains optimal cardiac output and hemodynamic stability  12/28/2024 0416 by Mary Mcgill RN  Outcome: Progressing  Flowsheets (Taken 12/27/2024 1808 by Earnest Rowland, RN)  Maintains optimal cardiac output and hemodynamic stability:   Monitor blood pressure and heart rate   Monitor urine output and notify Licensed Independent Practitioner for values outside of normal range   Assess for signs of decreased cardiac output   Administer fluid and/or volume expanders as ordered   Administer vasoactive medications as ordered     Problem: Cardiovascular - Adult  Goal: Absence of cardiac dysrhythmias or at baseline  Outcome: Progressing  Flowsheets (Taken 12/28/2024 0416)  Absence of cardiac dysrhythmias or at baseline:   Monitor cardiac rate and rhythm   Administer antiarrhythmia medication and electrolyte replacement as ordered   Assess for signs of decreased cardiac output     Problem: Metabolic/Fluid and Electrolytes - Adult  Goal: Electrolytes maintained within normal limits  12/28/2024 0416 by Mary Mcgill RN  Outcome: Progressing  Flowsheets (Taken 12/27/2024 1808 by Earnest Rowland RN)  Electrolytes maintained within normal limits:   Monitor labs and assess patient for signs and symptoms of electrolyte imbalances   Administer electrolyte replacement as ordered   Fluid restriction as ordered   Monitor response to electrolyte replacements, including repeat lab results as appropriate   Instruct patient on fluid and nutrition restrictions as appropriate     Problem: Metabolic/Fluid and Electrolytes - Adult  Goal: Hemodynamic stability and optimal renal function maintained  Outcome: Progressing  Flowsheets (Taken 12/28/2024 0416)  Hemodynamic stability and optimal renal function maintained:   Monitor labs and assess for signs and symptoms of volume excess or deficit   Monitor intake, output and patient weight   Monitor urine specific gravity, serum

## 2024-12-28 NOTE — PROGRESS NOTES
ONCOLOGY HEMATOLOGY CARE PROGRESS NOTE      SUBJECTIVE:    Patient was seen at the bedside.  History of metastatic melanoma with bilateral pleural effusion, admitted with hypoxia.  States that her breathing has significantly improved.  However, complains of generalized weakness and fatigueEvaluated by pulmonology-recommendations noted.      ROS:     Same as above  OBJECTIVE        Physical    VITALS:  Patient Vitals for the past 24 hrs:   BP Temp Temp src Pulse Resp SpO2 Weight   12/28/24 1145 (!) 136/90 97.6 °F (36.4 °C) Oral 83 16 91 % --   12/28/24 1000 -- -- -- 87 16 -- --   12/28/24 0803 (!) 166/84 97 °F (36.1 °C) Oral 80 16 92 % --   12/28/24 0600 -- -- -- 82 -- -- --   12/28/24 0540 -- -- -- -- -- -- 65 kg (143 lb 4.8 oz)   12/28/24 0400 -- -- -- 76 -- -- --   12/28/24 0338 (!) 163/81 97.6 °F (36.4 °C) Oral 80 16 93 % --   12/28/24 0200 -- -- -- 78 -- -- --   12/28/24 0003 (!) 174/87 97.5 °F (36.4 °C) Oral 79 16 93 % --   12/28/24 0000 -- -- -- 79 -- -- --   12/27/24 2200 -- -- -- 78 -- -- --   12/27/24 2000 -- -- -- 76 -- -- --   12/27/24 1948 (!) 179/87 97.5 °F (36.4 °C) Oral 75 16 92 % --   12/27/24 1534 (!) 180/81 97.4 °F (36.3 °C) Oral 74 16 93 % --       24HR INTAKE/OUTPUT:    Intake/Output Summary (Last 24 hours) at 12/28/2024 1456  Last data filed at 12/28/2024 1400  Gross per 24 hour   Intake 190 ml   Output 2100 ml   Net -1910 ml       CONSTITUTIONAL: awake, alert, cooperative, no apparent distress   EYES: pupils equal, round and reactive to light, sclera clear and conjunctiva normal  ENT: Normocephalic, without obvious abnormality, atraumatic  NECK: supple, symmetrical, no jugular venous distension and no carotid bruits   HEMATOLOGIC/LYMPHATIC: no cervical, supraclavicular or axillary lymphadenopathy   LUNGS: no increased work of breathing and clear to auscultation ,Decreased air entry at the bases.  CARDIOVASCULAR: regular rate and rhythm, normal S1 and S2, no  11/8/2024    FINDINGS: There is no evidence of any pulmonary embolism. There is some air identified within the nondependent portion of the portal vein which is attributed to be iatrogenic. Thoracic aorta is without aneurysm or dissection. There is a small pericardial   effusion.    No axillary, mediastinal or hilar lymphadenopathy is identified.    There is a 8.6 x 5.5 cm mass identified within the collapsed left lower lobe. There is a moderate to large layering right-sided pleural effusion resulting in partial collapse of the dependent portion right lower lobe. There is a small to moderate size   layering left pleural effusion with some groundglass atelectasis within the dependent portions of the left upper lobe and superior segment of the left lower lobe.    There are multiple bilateral renal cysts which are partially visualized. Correlate for polycystic kidney disease. There is a stable 2.8 cm cyst identified within the posterior superior segment of the right lobe of liver. There is nonobstructing   left-sided nephrolithiasis. There is anasarca identified within the dependent subcutaneous tissues of the thorax.    No osteolytic or osteoblastic lesions are identified.       Impression: 1. No evidence of any pulmonary embolism.  2. Left lower lobe mass. This is worrisome for a primary lung cancer. Pathologic correlation is recommended.  3. Moderate to large layering right-sided pleural effusion with adjacent atelectasis.  4. Small to moderate size layering left-sided pleural effusion.  5. Polycystic kidneys.    Electronically signed by Moshe Coelho MD  XR CHEST (2 VW)  Narrative: Examination: Chest x-ray 2 view    Exam time: 12/26/2024 17:46 EST     Clinical history: Shortness of Breath    Comparison: 4 December 2024    Technique: Frontal and lateral views of the chest were obtained.    Findings:   There is increasing right basilar infiltrate. A right pleural effusion is present. Could be developing

## 2024-12-28 NOTE — PROGRESS NOTES
Ph: [phone], Fax: [phone]           [facility]               Reason for admission:    CATHRYN   Bilateral pleural effusion       Brief Summary :     Jonna Erickson is being seen by nephrology for CATHRYN.      Interval History and plan:      Patient seen at bedside.  Pulmonary note reviewed  Labs are pending     Plan:  -Urine protein and creatinine is 2.8 grams / day and likely from sirolimus ( Hold )  -Diuresis with IV lasix   -Renal ultrasound of transplant kidney   -Send labs for donor specific antibodies, BK virus  -RFP daily, monitor creatinine closely   -Hold immunosuppressant for now                      Assessment :   Acute Kidney Injury - unclear etiology   Transplant kidney in 2007 - on sirolimus, follows with Transplant nephrologist, Dr Marin Kowalski at   -Presented with a creatinine of 1.8 from a baseline of 0.8 on 12/05. Received contrast after the lab draw. Creatinine is 1.9 today. Recent CT abdomen/pelvis on 12/05/2024 revealed grossly hydronephrotic transplant  kidney with dilation of the renal pelvis and ureter. Repeat imaging 12/05 showed the renal pelvis and ureter are surrounded by either wall thickening or fluid defined by the walls. She has decreased appetite and PO intake. Etiology could be obstructive uropathy or prerenal. Acute rejection is considered but less likely. Recent contrast could further worsen CATHRYN.   Received 40 mg IV lasix on admission and had made only 350 ml urine so far. She is not on diuretics at home. Per son, she has been getting her sirolimus at home.   -Bladder scan Q shift   -Can consider echo given possible underlying heart failure with bilateral leg swelling, orthopnea and elevated BNP.  -Daily RFP   -Strict Is/Os  -Avoid NSAIDs (Ibuprofen, Aleve, Motrin, Naproxen, Toradol etc), Fleet enemas, IV contrast Dye and other nephrotoxins!     Possible UTI   -UA notable for proteinuria, trace LE, 4+ bacteria, 6-9 WBC.   -reports some abdominal discomfort,

## 2024-12-28 NOTE — PROGRESS NOTES
Keppra  -x1 dose 40mg lasix, monitor Cr and continue diuresis of Cr doesn't increase  -Oncology consulted- holding off on further treatment/next steps until patient is more stable  - palliative consulted- will complete POA paperwork   - for now,  is primary decision maker    - patient has desired aggressive treatment in terms of her metastatic melanoma  -Pulmonology consulted   - Will wait for subjective improvement prior to considering to thoracentesis   - Infectious workup pending  - Echo pending  - Treating for possible PNA, ceftriaxone and azithromycin        CATHRYN  Polycystic kidney disease  Possible UTI  S/p kidney transplant 2007  Patient takes sirolimus but was stopped recently. Patient was told to hold them while getting radiation treatments and family thinks she is supposed to restart them soon. Will follow up Heme Onc recommendations as to restarting. CATHRYN likely cardiorenal given fluid overload state. Will monitor response to lasix. Rejection is also always a concern given she has been off her sirolimus.  -Cr 1.8 on admission, baseline 0.8   - output 1.8 L 12/28  -Avoid nephrotoxic agents  -Trend Cr on daily RFP  - Ceftriaxone  -Consult nephrology   -Urine protein and creatinine  -Diuresis with IV lasix   -Renal ultrasound   -Send labs for donor specific antibodies, BK virus  -RFP daily, monitor creatinine closely   -Hold immunosuppressant for now   -Check tacrolimus level   - urine protein/cr normal at 2.8.    - protein levels decreased at 83.20  - ordered renal ultrasound 12/28      DVT PPx: Heparin   Diet:  ADULT DIET; Regular  ADULT ORAL NUTRITION SUPPLEMENT; Breakfast; Standard High Calorie/High Protein Oral Supplement   Code status:  Full Code     ELOS: 3  Barriers to discharge:   Disposition  - Preadmission: Home  - Current: PCU  - Upon discharge: ?      Will discuss with attending physician Marni Anne MD.     Lula Schulz DO, PGY-2  Internal Medicine Resident  Contact via

## 2024-12-28 NOTE — PLAN OF CARE
Problem: Discharge Planning  Goal: Discharge to home or other facility with appropriate resources  12/28/2024 0814 by Roxana Del Rio RN  Outcome: Progressing  Flowsheets (Taken 12/28/2024 0814)  Discharge to home or other facility with appropriate resources:   Identify barriers to discharge with patient and caregiver   Arrange for needed discharge resources and transportation as appropriate     Problem: Safety - Adult  Goal: Free from fall injury  12/28/2024 0814 by Roxana Del Rio RN  Outcome: Progressing  Flowsheets (Taken 12/28/2024 0814)  Free From Fall Injury: Instruct family/caregiver on patient safety     Problem: ABCDS Injury Assessment  Goal: Absence of physical injury  12/28/2024 0814 by Roxana Del Rio RN  Outcome: Progressing  Flowsheets (Taken 12/28/2024 0814)  Absence of Physical Injury: Implement safety measures based on patient assessment     Problem: Chronic Conditions and Co-morbidities  Goal: Patient's chronic conditions and co-morbidity symptoms are monitored and maintained or improved  12/28/2024 0814 by Roxana Del Rio RN  Outcome: Progressing  Flowsheets (Taken 12/28/2024 0814)  Care Plan - Patient's Chronic Conditions and Co-Morbidity Symptoms are Monitored and Maintained or Improved:   Monitor and assess patient's chronic conditions and comorbid symptoms for stability, deterioration, or improvement   Collaborate with multidisciplinary team to address chronic and comorbid conditions and prevent exacerbation or deterioration     Problem: Respiratory - Adult  Goal: Achieves optimal ventilation and oxygenation  Outcome: Progressing  Flowsheets (Taken 12/28/2024 0814)  Achieves optimal ventilation and oxygenation:   Assess for changes in respiratory status   Assess for changes in mentation and behavior     Problem: Cardiovascular - Adult  Goal: Maintains optimal cardiac output and hemodynamic stability  12/28/2024 0814 by Roxana Del Rio RN  Outcome: Progressing  Flowsheets (Taken

## 2024-12-29 LAB
ABO + RH BLD: NORMAL
ALBUMIN SERPL-MCNC: 2.4 G/DL (ref 3.4–5)
ANION GAP SERPL CALCULATED.3IONS-SCNC: 12 MMOL/L (ref 3–16)
BACTERIA UR CULT: ABNORMAL
BASOPHILS # BLD: 0 K/UL (ref 0–0.2)
BASOPHILS NFR BLD: 0.4 %
BLD GP AB SCN SERPL QL: NORMAL
BUN SERPL-MCNC: 68 MG/DL (ref 7–20)
C3 SERPL-MCNC: 136 MG/DL (ref 90–180)
C4 SERPL-MCNC: 19.7 MG/DL (ref 10–40)
CALCIUM SERPL-MCNC: 7.9 MG/DL (ref 8.3–10.6)
CHLORIDE SERPL-SCNC: 101 MMOL/L (ref 99–110)
CO2 SERPL-SCNC: 22 MMOL/L (ref 21–32)
CREAT SERPL-MCNC: 2.2 MG/DL (ref 0.6–1.2)
DEPRECATED RDW RBC AUTO: 19.4 % (ref 12.4–15.4)
EOSINOPHIL # BLD: 0.1 K/UL (ref 0–0.6)
EOSINOPHIL NFR BLD: 0.6 %
GFR SERPLBLD CREATININE-BSD FMLA CKD-EPI: 21 ML/MIN/{1.73_M2}
GLUCOSE SERPL-MCNC: 84 MG/DL (ref 70–99)
HCT VFR BLD AUTO: 31.3 % (ref 36–48)
HGB BLD-MCNC: 10.2 G/DL (ref 12–16)
INR PPP: 1 (ref 0.85–1.15)
LYMPHOCYTES # BLD: 1.4 K/UL (ref 1–5.1)
LYMPHOCYTES NFR BLD: 16.1 %
MAGNESIUM SERPL-MCNC: 2 MG/DL (ref 1.8–2.4)
MCH RBC QN AUTO: 29 PG (ref 26–34)
MCHC RBC AUTO-ENTMCNC: 32.4 G/DL (ref 31–36)
MCV RBC AUTO: 89.6 FL (ref 80–100)
MONOCYTES # BLD: 0.7 K/UL (ref 0–1.3)
MONOCYTES NFR BLD: 7.9 %
NEUTROPHILS # BLD: 6.5 K/UL (ref 1.7–7.7)
NEUTROPHILS NFR BLD: 75 %
ORGANISM: ABNORMAL
PHOSPHATE SERPL-MCNC: 4.1 MG/DL (ref 2.5–4.9)
PLATELET # BLD AUTO: 137 K/UL (ref 135–450)
PMV BLD AUTO: 7.3 FL (ref 5–10.5)
POTASSIUM SERPL-SCNC: 3.8 MMOL/L (ref 3.5–5.1)
PROTHROMBIN TIME: 13.4 SEC (ref 11.9–14.9)
RBC # BLD AUTO: 3.5 M/UL (ref 4–5.2)
SODIUM SERPL-SCNC: 135 MMOL/L (ref 136–145)
WBC # BLD AUTO: 8.7 K/UL (ref 4–11)

## 2024-12-29 PROCEDURE — 86901 BLOOD TYPING SEROLOGIC RH(D): CPT

## 2024-12-29 PROCEDURE — 85610 PROTHROMBIN TIME: CPT

## 2024-12-29 PROCEDURE — 86225 DNA ANTIBODY NATIVE: CPT

## 2024-12-29 PROCEDURE — 6360000002 HC RX W HCPCS

## 2024-12-29 PROCEDURE — 99222 1ST HOSP IP/OBS MODERATE 55: CPT | Performed by: SURGERY

## 2024-12-29 PROCEDURE — 2500000003 HC RX 250 WO HCPCS

## 2024-12-29 PROCEDURE — 80069 RENAL FUNCTION PANEL: CPT

## 2024-12-29 PROCEDURE — 2580000003 HC RX 258

## 2024-12-29 PROCEDURE — 86039 ANTINUCLEAR ANTIBODIES (ANA): CPT

## 2024-12-29 PROCEDURE — 83516 IMMUNOASSAY NONANTIBODY: CPT

## 2024-12-29 PROCEDURE — 86900 BLOOD TYPING SEROLOGIC ABO: CPT

## 2024-12-29 PROCEDURE — 85025 COMPLETE CBC W/AUTO DIFF WBC: CPT

## 2024-12-29 PROCEDURE — 86038 ANTINUCLEAR ANTIBODIES: CPT

## 2024-12-29 PROCEDURE — 86160 COMPLEMENT ANTIGEN: CPT

## 2024-12-29 PROCEDURE — 2580000003 HC RX 258: Performed by: INTERNAL MEDICINE

## 2024-12-29 PROCEDURE — 6370000000 HC RX 637 (ALT 250 FOR IP)

## 2024-12-29 PROCEDURE — 36415 COLL VENOUS BLD VENIPUNCTURE: CPT

## 2024-12-29 PROCEDURE — 86235 NUCLEAR ANTIGEN ANTIBODY: CPT

## 2024-12-29 PROCEDURE — 83735 ASSAY OF MAGNESIUM: CPT

## 2024-12-29 PROCEDURE — 2060000000 HC ICU INTERMEDIATE R&B

## 2024-12-29 PROCEDURE — 86850 RBC ANTIBODY SCREEN: CPT

## 2024-12-29 RX ORDER — LIDOCAINE HYDROCHLORIDE AND EPINEPHRINE 10; 10 MG/ML; UG/ML
20 INJECTION, SOLUTION INFILTRATION; PERINEURAL ONCE
Status: COMPLETED | OUTPATIENT
Start: 2024-12-29 | End: 2024-12-30

## 2024-12-29 RX ORDER — MECOBALAMIN 5000 MCG
5 TABLET,DISINTEGRATING ORAL NIGHTLY
Status: CANCELLED | OUTPATIENT
Start: 2024-12-29

## 2024-12-29 RX ORDER — SODIUM CHLORIDE 9 MG/ML
INJECTION, SOLUTION INTRAVENOUS CONTINUOUS
Status: ACTIVE | OUTPATIENT
Start: 2024-12-29 | End: 2024-12-29

## 2024-12-29 RX ADMIN — HEPARIN SODIUM 5000 UNITS: 5000 INJECTION INTRAVENOUS; SUBCUTANEOUS at 14:05

## 2024-12-29 RX ADMIN — SODIUM CHLORIDE: 0.9 INJECTION, SOLUTION INTRAVENOUS at 13:48

## 2024-12-29 RX ADMIN — AZITHROMYCIN MONOHYDRATE 250 MG: 500 INJECTION, POWDER, LYOPHILIZED, FOR SOLUTION INTRAVENOUS at 12:38

## 2024-12-29 RX ADMIN — LEVETIRACETAM 500 MG: 500 TABLET, FILM COATED ORAL at 21:53

## 2024-12-29 RX ADMIN — HEPARIN SODIUM 5000 UNITS: 5000 INJECTION INTRAVENOUS; SUBCUTANEOUS at 05:21

## 2024-12-29 RX ADMIN — WATER 1000 MG: 1 INJECTION INTRAMUSCULAR; INTRAVENOUS; SUBCUTANEOUS at 12:09

## 2024-12-29 RX ADMIN — SODIUM CHLORIDE, PRESERVATIVE FREE 10 ML: 5 INJECTION INTRAVENOUS at 22:00

## 2024-12-29 RX ADMIN — SODIUM CHLORIDE, PRESERVATIVE FREE 10 ML: 5 INJECTION INTRAVENOUS at 09:30

## 2024-12-29 RX ADMIN — HEPARIN SODIUM 5000 UNITS: 5000 INJECTION INTRAVENOUS; SUBCUTANEOUS at 21:53

## 2024-12-29 RX ADMIN — LEVETIRACETAM 500 MG: 500 TABLET, FILM COATED ORAL at 09:29

## 2024-12-29 ASSESSMENT — PAIN SCALES - GENERAL
PAINLEVEL_OUTOF10: 0

## 2024-12-29 NOTE — CONSULTS
General Surgery   Resident Consult Note    Reason for Consult: punch biopsy for vasculitis    History of Present Illness:   Jonna Erickson is a 89 y.o. 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     General surgery is consulted for assistance with punch biopsy to help assess for vasculitis. Patient seen with daughter in the room. They state that they first noticed the lesions about 1-2 weeks ago following her gamma knife treatment. They are not painful or pruritic. Not associated with any changes in sensation or motor function.     Vasculitis labs pending. Is not on blood thinners.     Past Medical History:    No past medical history on file.    Past Surgical History:           Procedure Laterality Date    CT NEEDLE BIOPSY LUNG PERCUTANEOUS W IMAGING GUIDANCE  11/19/2024    CT NEEDLE BIOPSY LUNG PERCUTANEOUS 11/19/2024 [facility] CT SCAN    KIDNEY TRANSPLANT         Allergies:  Patient has no known allergies.    Medications:   Home Meds  No current facility-administered medications on file prior to encounter.     Current Outpatient Medications on File Prior to Encounter   Medication Sig Dispense Refill    predniSONE (DELTASONE) 10 MG tablet Take 1 tablet by mouth daily Was off while taking dexamethasone.  Did take one dose at home on 12/26 after weaning off dexamethasone      oxyCODONE (ROXICODONE) 5 MG immediate release tablet Take 1 tablet by mouth every 6 hours as needed for Pain. Rx filled 12/14/24 - never took  Max Daily Amount: 20 mg      levETIRAcetam (KEPPRA) 500 MG tablet Take 1 tablet by mouth 2 times daily 60 tablet 3    dexAMETHasone (DECADRON) 4 MG tablet Take 1 tablet by mouth every morning (Patient taking differently: Take 1 tablet by mouth every morning Completed taper-off.   Was 4 mg BID, then 12/23-12/24 took 4 mg once daily, 12/25 and 12/26 took 2 mg once daily) 30 tablet 0    pantoprazole (PROTONIX) 40 MG tablet Take 1 tablet by mouth every

## 2024-12-29 NOTE — MANAGEMENT PLAN
Received a [employer] from nurse regarding DNR/DNI paperwork that was submitted by a family member and signed by the patient.  Went to bedside to confirm this CODE STATUS change from full code.  Patient's daughter and  were present during the conversation.  Patient felt hesitant to change the CODE STATUS from full code to DNR/DNI.  Patient and family members would like to speak to oncology before making a CODE STATUS change.  She will remain full code for now.

## 2024-12-29 NOTE — PLAN OF CARE
Problem: Discharge Planning  Goal: Discharge to home or other facility with appropriate resources  Outcome: Progressing  Flowsheets  Taken 12/29/2024 0959 by Roxana Del Rio RN  Discharge to home or other facility with appropriate resources: Identify barriers to discharge with patient and caregiver  Taken 12/28/2024 2000 by Ping Smith RN  Discharge to home or other facility with appropriate resources:   Identify barriers to discharge with patient and caregiver   Arrange for needed discharge resources and transportation as appropriate   Identify discharge learning needs (meds, wound care, etc)     Problem: Safety - Adult  Goal: Free from fall injury  Outcome: Progressing  Flowsheets (Taken 12/29/2024 0959)  Free From Fall Injury:   Instruct family/caregiver on patient safety   Based on caregiver fall risk screen, instruct family/caregiver to ask for assistance with transferring infant if caregiver noted to have fall risk factors     Problem: ABCDS Injury Assessment  Goal: Absence of physical injury  Outcome: Progressing  Flowsheets (Taken 12/29/2024 0959)  Absence of Physical Injury: Implement safety measures based on patient assessment     Problem: Chronic Conditions and Co-morbidities  Goal: Patient's chronic conditions and co-morbidity symptoms are monitored and maintained or improved  Outcome: Progressing  Flowsheets  Taken 12/29/2024 0959 by Roxana Del Rio RN  Care Plan - Patient's Chronic Conditions and Co-Morbidity Symptoms are Monitored and Maintained or Improved:   Monitor and assess patient's chronic conditions and comorbid symptoms for stability, deterioration, or improvement   Collaborate with multidisciplinary team to address chronic and comorbid conditions and prevent exacerbation or deterioration  Taken 12/28/2024 2000 by Ping Smith RN  Care Plan - Patient's Chronic Conditions and Co-Morbidity Symptoms are Monitored and Maintained or Improved:   Monitor and assess patient's chronic  RN  Hemodynamic stability and optimal renal function maintained:   Monitor labs and assess for signs and symptoms of volume excess or deficit   Monitor intake, output and patient weight  Taken 12/28/2024 2000 by Ping Smith RN  Hemodynamic stability and optimal renal function maintained:   Monitor labs and assess for signs and symptoms of volume excess or deficit   Monitor intake, output and patient weight     Problem: Pain  Goal: Verbalizes/displays adequate comfort level or baseline comfort level  Outcome: Progressing  Flowsheets  Taken 12/29/2024 0959  Verbalizes/displays adequate comfort level or baseline comfort level:   Encourage patient to monitor pain and request assistance   Assess pain using appropriate pain scale  Taken 12/29/2024 0755  Verbalizes/displays adequate comfort level or baseline comfort level: Encourage patient to monitor pain and request assistance

## 2024-12-29 NOTE — PROGRESS NOTES
Ph: [phone], Fax: [phone]           [facility]               Reason for admission:    CATHRYN   Bilateral pleural effusion       Brief Summary :     Jonna Erickson is being seen by nephrology for CATHRYN.      Interval History and plan:      Patient seen at bedside.  Pulmonary note reviewed  Creatinine is worsening 1.9 > 2.2     Plan:  -Urine protein and creatinine is 2.8 grams / day and likely from sirolimus ( Hold )  -Diuresis with IV lasix   -Renal ultrasound of transplant kidney   -Send labs for donor specific antibodies, BK virus  -RFP daily, monitor creatinine closely   -Hold immunosuppressant for now   - she will need a transplant kidney biopsy and may be a transfer to a onco-renal transplant center like .    She has proteinuria and CATHRYN . She off all IS due to melanoma                      Assessment :   Acute Kidney Injury - unclear etiology   Transplant kidney in 2007 - on sirolimus, follows with Transplant nephrologist, Dr Marin Kowalski at   -Presented with a creatinine of 1.8 from a baseline of 0.8 on 12/05. Received contrast after the lab draw. Creatinine is 1.9 today. Recent CT abdomen/pelvis on 12/05/2024 revealed grossly hydronephrotic transplant  kidney with dilation of the renal pelvis and ureter. Repeat imaging 12/05 showed the renal pelvis and ureter are surrounded by either wall thickening or fluid defined by the walls. She has decreased appetite and PO intake. Etiology could be obstructive uropathy or prerenal. Acute rejection is considered but less likely. Recent contrast could further worsen CATHRYN.   Received 40 mg IV lasix on admission and had made only 350 ml urine so far. She is not on diuretics at home. Per son, she has been getting her sirolimus at home.   -Bladder scan Q shift   -Can consider echo given possible underlying heart failure with bilateral leg swelling, orthopnea and elevated BNP.  -Daily RFP   -Strict Is/Os  -Avoid NSAIDs (Ibuprofen, Aleve, Motrin,  disintegrating tablet 8 mg, 8 mg, Oral, Q8H PRN **OR** ondansetron (ZOFRAN) injection 8 mg, 8 mg, IntraVENous, Q6H PRN  sodium chloride flush 0.9 % injection 5-40 mL, 5-40 mL, IntraVENous, 2 times per day  sodium chloride flush 0.9 % injection 5-40 mL, 5-40 mL, IntraVENous, PRN  0.9 % sodium chloride infusion, , IntraVENous, PRN  polyethylene glycol (GLYCOLAX) packet 17 g, 17 g, Oral, Daily PRN  acetaminophen (TYLENOL) tablet 650 mg, 650 mg, Oral, Q6H PRN **OR** acetaminophen (TYLENOL) suppository 650 mg, 650 mg, Rectal, Q6H PRN  heparin (porcine) injection 5,000 Units, 5,000 Units, SubCUTAneous, 3 times per day  levETIRAcetam (KEPPRA) tablet 500 mg, 500 mg, Oral, BID    Vitals :     Vitals:    12/29/24 1000   BP:    Pulse: 77   Resp: 19   Temp:    SpO2:           Physical Examination :     appearance: Alert, orientated  Respiratory: no distress, decreased breath sound bilateral lung bases   Cardiovascular: no visibly raised JVD, 2+ bilateral pitting edema up to below the knees  Abdomen: -  soft, non tender, non distended   Other relevant findings: appears fatigued and takes time to respond to questions      Labs :     CBC:   Recent Labs     12/27/24  0523 12/28/24  0755 12/29/24  0838   WBC 10.3 9.9 8.7   HGB 11.4* 11.9* 10.2*   HCT 34.9* 37.5 31.3*    160 137     BMP:    Recent Labs     12/27/24  0523 12/28/24  0755 12/29/24  0838   * 135* 135*   K 4.0 4.2 3.8    101 101   CO2 21 18* 22   BUN 67* 67* 68*   CREATININE 1.9* 1.9* 2.2*   GLUCOSE 88 63* 84   MG 2.10 2.11 2.00   PHOS 3.9 4.8 4.1     Lab Results   Component Value Date/Time    COLORU Yellow 12/27/2024 10:04 AM    NITRU Negative 12/27/2024 10:04 AM    GLUCOSEU Negative 12/27/2024 10:04 AM    KETUA Negative 12/27/2024 10:04 AM    UROBILINOGEN 0.2 12/27/2024 10:04 AM    BILIRUBINUR Negative 12/27/2024 10:04 AM        ----------------------------------------------------------  Please call with questions at      24 Hrs Answering service

## 2024-12-29 NOTE — PROGRESS NOTES
Internal Medicine Progress Note    Patient Name: Jonna Erickson   Patient : 1935   Date: 2024   Admit Date: 2024     CC: Shortness of Breath (Pt states they have increased SOB for the last couple weeks. Per EMS pt O2 saturation was 80% on RA when they arrived. Pt placed on 2L NC by EMS and stated O2 came up to 97%)       Interval History   Patient was seen and examined at bedside. VSS. No acute overnight events. Cr from 19 to 2.2 this morning.  Continues to have anasarca.  Ordered renal ultrasound, which came back negative. Ordered ECHO, waiting for results. Urine culture came back positive Klebsiella, will wait for sensitivities. Will continue Rocephin for now.     Non blanchable rash noted on bilateral extremities, ordered complement levels . Have also ordered punch biopsy, surgery consulted.    - med/onc, pulm and palliative consult, will continue to appreciate recommendations    ROS   Review of Systems   Respiratory:  Positive for cough and shortness of breath.    Cardiovascular:  Positive for leg swelling. Negative for chest pain.   Gastrointestinal:  Positive for abdominal pain and nausea.          Objective     I/Os:  I/O last 3 completed shifts:  In: 430 [P.O.:420; I.V.:10]  Out: 2500 [Urine:2500]      Vital Signs:  Patient Vitals for the past 8 hrs:   BP Temp Temp src Pulse Resp SpO2   24 0502 (!) 144/71 98.2 °F (36.8 °C) Oral 82 18 92 %       Physical Exam:  Physical Exam  Vitals reviewed.   Constitutional:       General: She is not in acute distress.     Appearance: She is ill-appearing.   HENT:      Head: Normocephalic and atraumatic.      Comments: Periorbital edema     Right Ear: External ear normal.      Left Ear: External ear normal.      Nose: Nose normal.      Mouth/Throat:      Mouth: Mucous membranes are dry.      Pharynx: Oropharynx is clear.   Eyes:      Pupils: Pupils are equal, round, and reactive to light.   Cardiovascular:      Rate and Rhythm: Normal rate and    Recent Labs     12/26/24  1738 12/26/24  1833   TROPHS 73* 63*         Pro-BNP:   Recent Labs     12/26/24  1738   PROBNP 8,964*       Lipids: No results for input(s): \"CHOL\", \"TRIG\", \"HDL\", \"VLDL\" in the last 72 hours.    Invalid input(s): \"LDLCALC\"      ABGs:  No results for input(s): \"PHART\", \"KFJ6XFE\", \"PO2ART\", \"WCX3UTS\", \"BEART\", \"THGBART\", \"H4NSVDUL\", \"RVW8NXT\" in the last 72 hours.    VBGs:   Recent Labs     12/26/24  1738   PHVEN 7.356   GNF3TJN 45.8   PO2VEN <30.0       INR: No results for input(s): \"INR\" in the last 72 hours.  aPTT: No results for input(s): \"APTT\" in the last 72 hours.    Procalcitonin:   Recent Labs     12/27/24  0523   PROCAL 0.87*     CRP: No results for input(s): \"CRP\" in the last 72 hours.  ESR: No results for input(s): \"SEDRATE\" in the last 72 hours.      Microbiology:  Results       Procedure Component Value Units Date/Time    Legionella antigen, urine [[id number]] Collected: 12/27/24 0545    Order Status: Completed Specimen: Urine, clean catch Updated: 12/27/24 2237     L. pneumophila Serogp 1 Ur Ag --     Presumptive Negative  No Legionella pneumophila serogroup 1 antigens detected.  A negative result does not exclude infection with  Legionella pneumophila serogroup 1 nor does it rule out  other microbial-caused respiratory infections or  disease caused by other serogroups of  Legionella pneumophila.  Normal Range: Presumptive Negative      Narrative:      ORDER#: [id number]                          ORDERED BY: JESSICA BORJA  SOURCE: Urine Clean Catch                  COLLECTED:  12/27/24 05:45  ANTIBIOTICS AT LENA.:                      RECEIVED :  12/27/24 15:27    Strep Pneumoniae Antigen [[id number]] Collected: 12/27/24 0545    Order Status: Completed Specimen: Urine, clean catch Updated: 12/27/24 2238     STREP PNEUMONIAE ANTIGEN, URINE --     Presumptive Negative  Presumptive negative suggests no current or recent  pneumococcal infection. Infection due to Strep

## 2024-12-30 ENCOUNTER — APPOINTMENT (OUTPATIENT)
Age: 89
DRG: 699 | End: 2024-12-30
Payer: MEDICARE

## 2024-12-30 PROBLEM — R21 RASH: Status: ACTIVE | Noted: 2024-12-30

## 2024-12-30 PROBLEM — I77.6 VASCULITIS (HCC): Status: ACTIVE | Noted: 2024-12-30

## 2024-12-30 LAB
ALBUMIN SERPL-MCNC: 2.4 G/DL (ref 3.4–5)
ANA SER QL IA: POSITIVE
ANION GAP SERPL CALCULATED.3IONS-SCNC: 13 MMOL/L (ref 3–16)
BASOPHILS # BLD: 0.1 K/UL (ref 0–0.2)
BASOPHILS NFR BLD: 0.8 %
BUN SERPL-MCNC: 69 MG/DL (ref 7–20)
CALCIUM SERPL-MCNC: 7.8 MG/DL (ref 8.3–10.6)
CENTROMERE B IGG SER QL LINE BLOT: <0.2 AI (ref 0–0.9)
CHLORIDE SERPL-SCNC: 103 MMOL/L (ref 99–110)
CHROMATIN AB SERPL-ACNC: <0.2 AI (ref 0–0.9)
CO2 SERPL-SCNC: 21 MMOL/L (ref 21–32)
CREAT SERPL-MCNC: 2.2 MG/DL (ref 0.6–1.2)
DEPRECATED RDW RBC AUTO: 19.4 % (ref 12.4–15.4)
DSDNA AB SER-ACNC: <1 IU/ML (ref 0–9)
ECHO AO ROOT DIAM: 3.5 CM
ECHO AO ROOT INDEX: 2.02 CM/M2
ECHO AR MAX VEL PISA: 4.6 M/S
ECHO AV AREA PEAK VELOCITY: 1.9 CM2
ECHO AV AREA/BSA PEAK VELOCITY: 1.1 CM2/M2
ECHO AV PEAK GRADIENT: 6 MMHG
ECHO AV PEAK VELOCITY: 1.2 M/S
ECHO AV REGURGITANT PHT: 433 MS
ECHO AV VELOCITY RATIO: 0.67
ECHO BSA: 1.73 M2
ECHO IVC INSP: 0.4 CM
ECHO IVC PROX: 1.3 CM
ECHO LA AREA 2C: 15.9 CM2
ECHO LA AREA 4C: 14.8 CM2
ECHO LA MAJOR AXIS: 4.4 CM
ECHO LA MINOR AXIS: 5.1 CM
ECHO LA VOL BP: 42 ML (ref 22–52)
ECHO LA VOL MOD A2C: 39 ML (ref 22–52)
ECHO LA VOL MOD A4C: 40 ML (ref 22–52)
ECHO LA VOL/BSA BIPLANE: 24 ML/M2 (ref 16–34)
ECHO LA VOLUME INDEX MOD A2C: 23 ML/M2 (ref 16–34)
ECHO LA VOLUME INDEX MOD A4C: 23 ML/M2 (ref 16–34)
ECHO LV E' LATERAL VELOCITY: 5.5 CM/S
ECHO LV E' SEPTAL VELOCITY: 3.25 CM/S
ECHO LV EDV A2C: 112 ML
ECHO LV EDV A4C: 94 ML
ECHO LV EDV INDEX A4C: 54 ML/M2
ECHO LV EDV NDEX A2C: 65 ML/M2
ECHO LV EF PHYSICIAN: 53 %
ECHO LV EJECTION FRACTION A2C: 64 %
ECHO LV EJECTION FRACTION A4C: 43 %
ECHO LV EJECTION FRACTION BIPLANE: 54 % (ref 55–100)
ECHO LV ESV A2C: 41 ML
ECHO LV ESV A4C: 54 ML
ECHO LV ESV INDEX A2C: 24 ML/M2
ECHO LV ESV INDEX A4C: 31 ML/M2
ECHO LV FRACTIONAL SHORTENING: 34 % (ref 28–44)
ECHO LV GLOBAL LONGITUDINAL STRAIN (GLS): -14.3 %
ECHO LV GLOBAL LONGITUDINAL STRAIN (GLS): -14.9 %
ECHO LV GLOBAL LONGITUDINAL STRAIN (GLS): -15.6 %
ECHO LV GLOBAL LONGITUDINAL STRAIN (GLS): -17.7 %
ECHO LV INTERNAL DIMENSION DIASTOLE INDEX: 2.02 CM/M2
ECHO LV INTERNAL DIMENSION DIASTOLIC: 3.5 CM (ref 3.9–5.3)
ECHO LV INTERNAL DIMENSION SYSTOLIC INDEX: 1.33 CM/M2
ECHO LV INTERNAL DIMENSION SYSTOLIC: 2.3 CM
ECHO LV IVSD: 1.1 CM (ref 0.6–0.9)
ECHO LV MASS 2D: 119 G (ref 67–162)
ECHO LV MASS INDEX 2D: 68.8 G/M2 (ref 43–95)
ECHO LV POSTERIOR WALL DIASTOLIC: 1.1 CM (ref 0.6–0.9)
ECHO LV RELATIVE WALL THICKNESS RATIO: 0.63
ECHO LVOT AREA: 2.8 CM2
ECHO LVOT DIAM: 1.9 CM
ECHO LVOT MEAN GRADIENT: 2 MMHG
ECHO LVOT PEAK GRADIENT: 3 MMHG
ECHO LVOT PEAK VELOCITY: 0.8 M/S
ECHO LVOT STROKE VOLUME INDEX: 29 ML/M2
ECHO LVOT SV: 50.2 ML
ECHO LVOT VTI: 17.7 CM
ECHO MV A VELOCITY: 0.97 M/S
ECHO MV E DECELERATION TIME (DT): 174 MS
ECHO MV E VELOCITY: 0.26 M/S
ECHO MV E/A RATIO: 0.27
ECHO MV E/E' LATERAL: 4.73
ECHO MV E/E' RATIO (AVERAGED): 6.36
ECHO MV E/E' SEPTAL: 8
ECHO PV MAX VELOCITY: 1.1 M/S
ECHO PV PEAK GRADIENT: 5 MMHG
ECHO RA AREA 4C: 9.5 CM2
ECHO RA END SYSTOLIC VOLUME APICAL 4 CHAMBER INDEX BSA: 12 ML/M2
ECHO RA VOLUME: 21 ML
ECHO RV BASAL DIMENSION: 2.8 CM
ECHO RV FREE WALL PEAK S': 11.1 CM/S
ECHO RV MID DIMENSION: 1.8 CM
ECHO RV TAPSE: 1.4 CM (ref 1.7–?)
ECHO TV REGURGITANT MAX VELOCITY: 2.94 M/S
ECHO TV REGURGITANT PEAK GRADIENT: 35 MMHG
ENA JO1 AB SER IA-ACNC: <0.2 AI (ref 0–0.9)
ENA RNP AB SER IA-ACNC: 1.4 AI (ref 0–0.9)
ENA SCL70 IGG SER IA-ACNC: <0.2 AI (ref 0–0.9)
ENA SM AB SER IA-ACNC: <0.2 AI (ref 0–0.9)
ENA SM+RNP IGG SER-ACNC: <0.2 AI (ref 0–0.9)
ENA SS-A AB SER IA-ACNC: <0.2 AI (ref 0–0.9)
ENA SS-B AB SER IA-ACNC: <0.2 AI (ref 0–0.9)
EOSINOPHIL # BLD: 0 K/UL (ref 0–0.6)
EOSINOPHIL NFR BLD: 0.6 %
GFR SERPLBLD CREATININE-BSD FMLA CKD-EPI: 21 ML/MIN/{1.73_M2}
GLUCOSE SERPL-MCNC: 77 MG/DL (ref 70–99)
HCT VFR BLD AUTO: 31.7 % (ref 36–48)
HGB BLD-MCNC: 10.4 G/DL (ref 12–16)
LYMPHOCYTES # BLD: 1.1 K/UL (ref 1–5.1)
LYMPHOCYTES NFR BLD: 14.3 %
MAGNESIUM SERPL-MCNC: 1.94 MG/DL (ref 1.8–2.4)
MCH RBC QN AUTO: 29.6 PG (ref 26–34)
MCHC RBC AUTO-ENTMCNC: 32.9 G/DL (ref 31–36)
MCV RBC AUTO: 89.9 FL (ref 80–100)
MONOCYTES # BLD: 0.5 K/UL (ref 0–1.3)
MONOCYTES NFR BLD: 6.4 %
NEUTROPHILS # BLD: 6.2 K/UL (ref 1.7–7.7)
NEUTROPHILS NFR BLD: 77.9 %
PHOSPHATE SERPL-MCNC: 4 MG/DL (ref 2.5–4.9)
PLATELET # BLD AUTO: 124 K/UL (ref 135–450)
PMV BLD AUTO: 7.6 FL (ref 5–10.5)
POTASSIUM SERPL-SCNC: 3.9 MMOL/L (ref 3.5–5.1)
RBC # BLD AUTO: 3.53 M/UL (ref 4–5.2)
RIBOSOMAL P AB SER IA-ACNC: <0.2 AI (ref 0–0.9)
SODIUM SERPL-SCNC: 137 MMOL/L (ref 136–145)
WBC # BLD AUTO: 7.9 K/UL (ref 4–11)

## 2024-12-30 PROCEDURE — 0HBKXZX EXCISION OF RIGHT LOWER LEG SKIN, EXTERNAL APPROACH, DIAGNOSTIC: ICD-10-PCS | Performed by: SURGERY

## 2024-12-30 PROCEDURE — 6360000002 HC RX W HCPCS

## 2024-12-30 PROCEDURE — 6370000000 HC RX 637 (ALT 250 FOR IP)

## 2024-12-30 PROCEDURE — 2060000000 HC ICU INTERMEDIATE R&B

## 2024-12-30 PROCEDURE — 92610 EVALUATE SWALLOWING FUNCTION: CPT

## 2024-12-30 PROCEDURE — 83735 ASSAY OF MAGNESIUM: CPT

## 2024-12-30 PROCEDURE — 99231 SBSQ HOSP IP/OBS SF/LOW 25: CPT | Performed by: SURGERY

## 2024-12-30 PROCEDURE — 93306 TTE W/DOPPLER COMPLETE: CPT

## 2024-12-30 PROCEDURE — 93356 MYOCRD STRAIN IMG SPCKL TRCK: CPT | Performed by: INTERNAL MEDICINE

## 2024-12-30 PROCEDURE — 2500000003 HC RX 250 WO HCPCS

## 2024-12-30 PROCEDURE — 6370000000 HC RX 637 (ALT 250 FOR IP): Performed by: INTERNAL MEDICINE

## 2024-12-30 PROCEDURE — 2580000003 HC RX 258

## 2024-12-30 PROCEDURE — 80069 RENAL FUNCTION PANEL: CPT

## 2024-12-30 PROCEDURE — 36415 COLL VENOUS BLD VENIPUNCTURE: CPT

## 2024-12-30 PROCEDURE — 85025 COMPLETE CBC W/AUTO DIFF WBC: CPT

## 2024-12-30 PROCEDURE — 92526 ORAL FUNCTION THERAPY: CPT

## 2024-12-30 PROCEDURE — 2580000003 HC RX 258: Performed by: INTERNAL MEDICINE

## 2024-12-30 PROCEDURE — 88300 SURGICAL PATH GROSS: CPT

## 2024-12-30 PROCEDURE — 93306 TTE W/DOPPLER COMPLETE: CPT | Performed by: INTERNAL MEDICINE

## 2024-12-30 RX ORDER — HYDROXYZINE HYDROCHLORIDE 10 MG/1
10 TABLET, FILM COATED ORAL ONCE
Status: COMPLETED | OUTPATIENT
Start: 2024-12-30 | End: 2024-12-30

## 2024-12-30 RX ORDER — AMLODIPINE BESYLATE 5 MG/1
5 TABLET ORAL DAILY
Status: DISCONTINUED | OUTPATIENT
Start: 2024-12-30 | End: 2025-01-01 | Stop reason: HOSPADM

## 2024-12-30 RX ORDER — SODIUM CHLORIDE 9 MG/ML
INJECTION, SOLUTION INTRAVENOUS CONTINUOUS
Status: ACTIVE | OUTPATIENT
Start: 2024-12-30 | End: 2024-12-30

## 2024-12-30 RX ADMIN — HEPARIN SODIUM 5000 UNITS: 5000 INJECTION INTRAVENOUS; SUBCUTANEOUS at 12:46

## 2024-12-30 RX ADMIN — SODIUM CHLORIDE, PRESERVATIVE FREE 10 ML: 5 INJECTION INTRAVENOUS at 21:26

## 2024-12-30 RX ADMIN — AZITHROMYCIN MONOHYDRATE 250 MG: 500 INJECTION, POWDER, LYOPHILIZED, FOR SOLUTION INTRAVENOUS at 14:59

## 2024-12-30 RX ADMIN — SODIUM CHLORIDE: 9 INJECTION, SOLUTION INTRAVENOUS at 09:59

## 2024-12-30 RX ADMIN — HEPARIN SODIUM 5000 UNITS: 5000 INJECTION INTRAVENOUS; SUBCUTANEOUS at 06:21

## 2024-12-30 RX ADMIN — Medication 10 EACH: at 09:56

## 2024-12-30 RX ADMIN — HEPARIN SODIUM 5000 UNITS: 5000 INJECTION INTRAVENOUS; SUBCUTANEOUS at 21:30

## 2024-12-30 RX ADMIN — MINERAL OIL 330 ML: 999 LIQUID ORAL at 10:08

## 2024-12-30 RX ADMIN — HYDROXYZINE HYDROCHLORIDE 10 MG: 10 TABLET ORAL at 16:09

## 2024-12-30 RX ADMIN — MINERAL OIL 330 ML: 999 LIQUID ORAL at 12:47

## 2024-12-30 RX ADMIN — SODIUM CHLORIDE, PRESERVATIVE FREE 10 ML: 5 INJECTION INTRAVENOUS at 08:42

## 2024-12-30 RX ADMIN — AMLODIPINE BESYLATE 5 MG: 5 TABLET ORAL at 09:59

## 2024-12-30 RX ADMIN — WATER 1000 MG: 1 INJECTION INTRAMUSCULAR; INTRAVENOUS; SUBCUTANEOUS at 12:47

## 2024-12-30 RX ADMIN — POLYETHYLENE GLYCOL 3350 17 G: 17 POWDER, FOR SOLUTION ORAL at 08:41

## 2024-12-30 RX ADMIN — LEVETIRACETAM 500 MG: 500 TABLET, FILM COATED ORAL at 21:26

## 2024-12-30 RX ADMIN — LEVETIRACETAM 500 MG: 500 TABLET, FILM COATED ORAL at 08:41

## 2024-12-30 RX ADMIN — LIDOCAINE HYDROCHLORIDE,EPINEPHRINE BITARTRATE 20 ML: 10; .01 INJECTION, SOLUTION INFILTRATION; PERINEURAL at 09:55

## 2024-12-30 ASSESSMENT — PAIN SCALES - GENERAL
PAINLEVEL_OUTOF10: 0

## 2024-12-30 ASSESSMENT — ENCOUNTER SYMPTOMS
COUGH: 1
ABDOMINAL PAIN: 1
NAUSEA: 1
SHORTNESS OF BREATH: 1

## 2024-12-30 NOTE — PROGRESS NOTES
ONCOLOGY HEMATOLOGY CARE PROGRESS NOTE      SUBJECTIVE:    Patient was seen at the bedside.  History of metastatic melanoma with 2 brain mets S/P GK, LLL lung met, bilateral pleural effusion, admitted with hypoxia.    Resting comfortably. Denies SOB currently. No BM for a couple of days.      ROS:   Same as above    OBJECTIVE        Physical    VITALS:  Patient Vitals for the past 24 hrs:   BP Temp Temp src Pulse Resp SpO2 Weight   12/30/24 0600 -- -- -- 75 -- -- 64.8 kg (142 lb 13.7 oz)   12/30/24 0400 -- -- -- 76 -- -- --   12/30/24 0337 (!) 160/82 98.3 °F (36.8 °C) Oral 82 18 91 % --   12/30/24 0200 -- -- -- 78 -- -- --   12/30/24 0000 -- -- -- 70 -- -- --   12/29/24 2336 (!) 166/74 98.3 °F (36.8 °C) Oral 73 20 92 % --   12/29/24 2200 -- -- -- 76 -- -- --   12/29/24 2000 -- -- -- 80 -- -- --   12/29/24 1928 (!) 171/89 98.6 °F (37 °C) Oral 78 18 90 % --   12/29/24 1811 -- -- -- 73 16 -- --   12/29/24 1500 (!) 171/70 97.1 °F (36.2 °C) Oral 67 10 95 % --   12/29/24 1328 -- -- -- 86 19 -- --   12/29/24 1203 (!) 164/78 97.4 °F (36.3 °C) Oral 79 20 92 % --   12/29/24 1000 -- -- -- 77 19 -- --   12/29/24 0755 (!) 165/74 97 °F (36.1 °C) Oral 71 14 91 % --       24HR INTAKE/OUTPUT:    Intake/Output Summary (Last 24 hours) at 12/30/2024 0769  Last data filed at 12/30/2024 0621  Gross per 24 hour   Intake 390 ml   Output 1500 ml   Net -1110 ml       CONSTITUTIONAL: ill-appearing, awake, cooperative, no apparent distress   EYES: pupils equal, round and reactive to light, sclera clear and conjunctiva normal  ENT: Normocephalic, without obvious abnormality, atraumatic  NECK: supple, symmetrical, no jugular venous distension and no carotid bruits   HEMATOLOGIC/LYMPHATIC: no cervical, supraclavicular or axillary lymphadenopathy   LUNGS: no increased work of breathing and clear to auscultation ,Decreased air entry at the bases.  CARDIOVASCULAR: regular rate and rhythm, normal S1 and S2, no

## 2024-12-30 NOTE — PLAN OF CARE
Problem: Chronic Conditions and Co-morbidities  Goal: Patient's chronic conditions and co-morbidity symptoms are monitored and maintained or improved  12/30/2024 0417 by aMry Mcgill, RN  Outcome: Progressing  Flowsheets (Taken 12/29/2024 0986 by Roxana Del Rio, RN)  Care Plan - Patient's Chronic Conditions and Co-Morbidity Symptoms are Monitored and Maintained or Improved:   Monitor and assess patient's chronic conditions and comorbid symptoms for stability, deterioration, or improvement   Collaborate with multidisciplinary team to address chronic and comorbid conditions and prevent exacerbation or deterioration  Note: Patient NPO since midnight. Punch skin biopsy anticipated in AM.

## 2024-12-30 NOTE — PROGRESS NOTES
Smog enema given as ordered with small amount of hard, brown stool removed. Pt states stool feels \"stuck\" and pt is unable to push it out.  notified via [employer] for possible need for disimpaction. MD to come to bedside to assess. Electronically signed by Anabel Arroyo RN on 12/30/2024 at 11:26 AM

## 2024-12-30 NOTE — PROGRESS NOTES
Ph: [phone], Fax: [phone]           [facility]               Reason for admission:    CATHRYN   Bilateral pleural effusion       Brief Summary :     Jonna Erickson is being seen by nephrology for CATHRYN.      Interval History and plan:      Patient seen with family in the room.  BP is elevated   Pulmonary note reviewed  Creatinine is worsening 1.9 > 2.2 ( baseline is 1.0 )    Plan:    -Urine protein and creatinine is 2.8 grams / day and likely from sirolimus ( Hold )  -Transplant kidney US reviewed. Has cyst   - Continue ABX   - NS x 500 ml  - Start amlodipine for high BP  - skin punch biopsy   - RFP daily, monitor creatinine closely   -Hold immunosuppressant for now in the face of melenoma.  -She may need a transplant kidney biopsy and may be a transfer to a onco-renal transplant center like  if no improvement .    She has proteinuria and CATHRYN . She off all IS due to melanoma                      Assessment :   Acute Kidney Injury - unclear etiology   Transplant kidney in 2007 - on sirolimus, follows with Transplant nephrologist, Dr Marin Kowalski at   -Presented with a creatinine of 1.8 from a baseline of 0.8 on 12/05. Received contrast after the lab draw. Creatinine is 1.9 today. Recent CT abdomen/pelvis on 12/05/2024 revealed grossly hydronephrotic transplant  kidney with dilation of the renal pelvis and ureter. Repeat imaging 12/05 showed the renal pelvis and ureter are surrounded by either wall thickening or fluid defined by the walls. She has decreased appetite and PO intake. Etiology could be obstructive uropathy or prerenal. Acute rejection is considered but less likely. Recent contrast could further worsen CATHRYN.   Received 40 mg IV lasix on admission and had made only 350 ml urine so far. She is not on diuretics at home. Per son, she has been getting her sirolimus at home.   -Bladder scan Q shift   -Can consider echo given possible underlying heart failure with bilateral leg swelling,  in sodium chloride 0.9 % 250 mL IVPB, 250 mg, IntraVENous, Q24H  ondansetron (ZOFRAN-ODT) disintegrating tablet 8 mg, 8 mg, Oral, Q8H PRN **OR** ondansetron (ZOFRAN) injection 8 mg, 8 mg, IntraVENous, Q6H PRN  sodium chloride flush 0.9 % injection 5-40 mL, 5-40 mL, IntraVENous, 2 times per day  sodium chloride flush 0.9 % injection 5-40 mL, 5-40 mL, IntraVENous, PRN  0.9 % sodium chloride infusion, , IntraVENous, PRN  polyethylene glycol (GLYCOLAX) packet 17 g, 17 g, Oral, Daily PRN  acetaminophen (TYLENOL) tablet 650 mg, 650 mg, Oral, Q6H PRN **OR** acetaminophen (TYLENOL) suppository 650 mg, 650 mg, Rectal, Q6H PRN  heparin (porcine) injection 5,000 Units, 5,000 Units, SubCUTAneous, 3 times per day  levETIRAcetam (KEPPRA) tablet 500 mg, 500 mg, Oral, BID    Vitals :     Vitals:    12/30/24 0830   BP: (!) 160/80   Pulse: 83   Resp: 16   Temp: 97.8 °F (36.6 °C)   SpO2: 93%          Physical Examination :     appearance: Alert, orientated  Respiratory: no distress, decreased breath sound bilateral lung bases   Cardiovascular: no visibly raised JVD, 2+ bilateral pitting edema up to below the knees  Abdomen: -  soft, non tender, non distended   Other relevant findings: appears fatigued and takes time to respond to questions      Labs :     CBC:   Recent Labs     12/28/24  0755 12/29/24  0838 12/30/24  0701   WBC 9.9 8.7 7.9   HGB 11.9* 10.2* 10.4*   HCT 37.5 31.3* 31.7*    137 124*     BMP:    Recent Labs     12/28/24  0755 12/29/24  0838 12/30/24  0701   * 135* 137   K 4.2 3.8 3.9    101 103   CO2 18* 22 21   BUN 67* 68* 69*   CREATININE 1.9* 2.2* 2.2*   GLUCOSE 63* 84 77   MG 2.11 2.00 1.94   PHOS 4.8 4.1 4.0     Lab Results   Component Value Date/Time    COLORU Yellow 12/27/2024 10:04 AM    NITRU Negative 12/27/2024 10:04 AM    GLUCOSEU Negative 12/27/2024 10:04 AM    KETUA Negative 12/27/2024 10:04 AM    UROBILINOGEN 0.2 12/27/2024 10:04 AM    BILIRUBINUR Negative 12/27/2024 10:04 AM

## 2024-12-30 NOTE — CONSULTS
[facility]    Consult Note      Requesting Physician: Dr. Ann Aj    CHIEF COMPLAINT:  Hypoxia, Left lung mass      HISTORY OF PRESENT ILLNESS:    Ms. Erickson  is a 89 y.o. female with history of kidney transplant and known metastatic melanoma, who recently completed Gamma Knife radiosurgery for 2 brain metastases. She also has a left lower lobe lung mass and bilateral pleural effusions (not new). She presented to the hospital after she was found to be hypoxic, with O2 sat in the 80s on 12/26/24.  Here, CTPA was negative for PE, but showed a near 9cm lung mass in the left lower lobe with moderate bilat pleural effusions. Also with anasarca. Compared to her CT on 11/8/24 this lung mass had minimally increased in size, but the bilat effusions were a bit more. She was also noted to have significant peripheral edema and a nonblanchable rash on her lower legs, for which she underwent punch biopsy today for workup of possible vasculitis. Also had CATHRYN on admission. Thought to be due to fluid overload. We were consulted for consideration of radiation to her lung mass.     Today, Shaylee is here w/ her daughter. She denies any shortness of breath while sitting in bed. No pillow orthopnea. No chest wall or rib pain. Her biggest issue is constipation - has not had a BM for a few days now despite enema this morning. No headaches, dizziness, no issues after her recent gamma knife procedure.     She was being considered for immunotherapy for her melanoma but given her kidney transplant and on immunosuppressants, this was still being considered prior to this admission / had not yet been started.     Past Medical History:  No past medical history on file.    Past Surgical History:  Past Surgical History:   Procedure Laterality Date    CT NEEDLE BIOPSY LUNG PERCUTANEOUS W IMAGING GUIDANCE   History     Socioeconomic History    Marital status:      Spouse name: Not on file    Number of children: Not on file    Years of education: Not on file    Highest education level: Not on file   Occupational History    Not on file   Tobacco Use    Smoking status: Never    Smokeless tobacco: Not on file   Substance and Sexual Activity    Alcohol use: Yes     Comment: occ    Drug use: Never    Sexual activity: Not on file   Other Topics Concern    Not on file   Social History Narrative    Not on file     Social Determinants of Health     Financial Resource Strain: Not on file   Food Insecurity: No Food Insecurity (12/27/2024)    Hunger Vital Sign     Worried About Running Out of Food in the Last Year: Never true     Ran Out of Food in the Last Year: Never true   Transportation Needs: No Transportation Needs (12/27/2024)    PRAPARE - Transportation     Lack of Transportation (Medical): No     Lack of Transportation (Non-Medical): No   Physical Activity: Not on file   Stress: Not on file   Social Connections: Not on file   Intimate Partner Violence: Not on file   Housing Stability: Low Risk  (12/27/2024)    Housing Stability Vital Sign     Unable to Pay for Housing in the Last Year: No     Number of Times Moved in the Last Year: 1     Homeless in the Last Year: No          Family History:  No family history on file.    REVIEW OF SYSTEMS:             PHYSICAL EXAM:      Vitals:  Patient Vitals for the past 24 hrs:   BP Temp Temp src Pulse Resp SpO2 Weight   12/30/24 1026 -- -- -- 78 -- -- --   12/30/24 0830 (!) 160/80 97.8 °F (36.6 °C) Oral 83 16 93 % --   12/30/24 0600 -- -- -- 75 -- -- 64.8 kg (142 lb 13.7 oz)   12/30/24 0400 -- -- -- 76 -- -- --   12/30/24 0337 (!) 160/82 98.3 °F (36.8 °C) Oral 82 18 91 % --   12/30/24 0200 -- -- -- 78 -- -- --   12/30/24 0000 -- -- -- 70 -- -- --   12/29/24 2336 (!) 166/74 98.3 °F (36.8 °C) Oral 73 20 92 % --   12/29/24 2200 -- -- -- 76 -- -- --   12/29/24 2000 -- -- -- 80 --

## 2024-12-30 NOTE — PLAN OF CARE
Problem: Discharge Planning  Goal: Discharge to home or other facility with appropriate resources  12/30/2024 0920 by Anabel Arroyo, RN  Outcome: Progressing  Flowsheets (Taken 12/30/2024 0830)  Discharge to home or other facility with appropriate resources: Identify barriers to discharge with patient and caregiver.  -Pt is from home with spouse. Case management following for discharge planning.     Problem: Safety - Adult  Goal: Free from fall injury  12/30/2024 0920 by Anabel Arroyo, RN  Outcome: Progressing  Flowsheets (Taken 12/30/2024 0830)  Free From Fall Injury: Instruct family/caregiver on patient safety.  -Pt is a High Fall Risk per Stoll Fall Risk Score. Fall precautions in place; bed alarm on. Pt encouraged to call for assistance.     Problem: Chronic Conditions and Co-morbidities  Goal: Patient's chronic conditions and co-morbidity symptoms are monitored and maintained or improved  12/30/2024 0920 by Anabel Arroyo, RN  Outcome: Progressing  Flowsheets (Taken 12/30/2024 0830)  Care Plan - Patient's Chronic Conditions and Co-Morbidity Symptoms are Monitored and Maintained or Improved: Monitor and assess patient's chronic conditions and comorbid symptoms for stability, deterioration, or improvement.     Problem: Respiratory - Adult  Goal: Achieves optimal ventilation and oxygenation  12/30/2024 0920 by Anabel Arroyo, RN  Outcome: Progressing  Flowsheets  Taken 12/30/2024 0918  Achieves optimal ventilation and oxygenation: Assess for changes in respiratory status.  -SpO2 level 93% on room air. Lungs are clear/diminished to auscultation. Pt denies cough or SOB at this time. Will monitor respiratory status for changes.     Problem: Pain  Goal: Verbalizes/displays adequate comfort level or baseline comfort level  12/30/2024 0920 by Anabel Arroyo, RN  Outcome: Progressing  Flowsheets (Taken 12/30/2024 0918)  Verbalizes/displays adequate comfort level or baseline comfort level: Assess pain

## 2024-12-30 NOTE — PLAN OF CARE
Problem: Discharge Planning  Goal: Discharge to home or other facility with appropriate resources  Outcome: Progressing  Flowsheets (Taken 12/29/2024 0959 by Roxana Del Rio, RN)  Discharge to home or other facility with appropriate resources: Identify barriers to discharge with patient and caregiver     Problem: Safety - Adult  Goal: Free from fall injury  Outcome: Progressing  Flowsheets (Taken 12/29/2024 0959 by Roxana Del Rio, RN)  Free From Fall Injury:   Instruct family/caregiver on patient safety   Based on caregiver fall risk screen, instruct family/caregiver to ask for assistance with transferring infant if caregiver noted to have fall risk factors     Problem: ABCDS Injury Assessment  Goal: Absence of physical injury  Outcome: Progressing  Flowsheets (Taken 12/29/2024 0959 by Roxana Del Rio, RN)  Absence of Physical Injury: Implement safety measures based on patient assessment     Problem: Chronic Conditions and Co-morbidities  Goal: Patient's chronic conditions and co-morbidity symptoms are monitored and maintained or improved  Outcome: Progressing  Flowsheets (Taken 12/29/2024 0959 by Roxana Del Rio, RN)  Care Plan - Patient's Chronic Conditions and Co-Morbidity Symptoms are Monitored and Maintained or Improved:   Monitor and assess patient's chronic conditions and comorbid symptoms for stability, deterioration, or improvement   Collaborate with multidisciplinary team to address chronic and comorbid conditions and prevent exacerbation or deterioration     Problem: Pain  Goal: Verbalizes/displays adequate comfort level or baseline comfort level  Outcome: Progressing  Flowsheets (Taken 12/29/2024 0959 by Roxana Del Rio, RN)  Verbalizes/displays adequate comfort level or baseline comfort level:   Encourage patient to monitor pain and request assistance   Assess pain using appropriate pain scale

## 2024-12-30 NOTE — PROGRESS NOTES
Bariatric Surgery   Daily Progress Note  Patient: Jonna Erickson      CC: punch biopsy for vasculitis     SUBJECTIVE:   No acute issues. Resting comfortably.     ROS:   A 14 point review of systems was conducted, significant findings as noted above. All other systems negative.    OBJECTIVE:    PHYSICAL EXAM:    Vitals:    12/30/24 0830 12/30/24 1026 12/30/24 1108 12/30/24 1120   BP: (!) 160/80  (!) 160/80 (!) 171/80   Pulse: 83 78  96   Resp: 16   14   Temp: 97.8 °F (36.6 °C)   97 °F (36.1 °C)   TempSrc: Oral   Oral   SpO2: 93%   94%   Weight:   64.4 kg (142 lb)    Height:   1.676 m (5' 6\")        General appearance: alert, no acute distress. Generalized anasarca  Eyes: PERRL, no scleral icterus  Neck: trachea midline, no JVD  Chest/Lungs: normal effort, no adventitious breathing, no accessory muscle use, on RA  Cardiovascular: RRR  Abdomen: soft, non-tender, non-distended, no guarding, no rigidity  Skin: bilateral lower extremities with scattered erythematous, nonblanching, lesions.   Neuro: A&Ox3    ASSESSMENT & PLAN:   This is a 89 y.o. female with Hx of Hx of kidney transplant and metastatic melanoma. Consulted for skin biopsy to assess for vasculitis.      -plan on bedside punch biopsy today  -RBA of procedure discussed with patient and daughter, who wish to proceed as above.   -INR wnl  -remainder of care per primary    Clarence Cortez DO  PGY-1, General Surgery Resident  12/30/24 12:13 PM  [employer]  [phone]

## 2024-12-30 NOTE — PROGRESS NOTES
Speech Language Pathology  Facility/Department:[facility]  Bedside Swallowing Evaluation/treatment   Name: Jonna Erickson  : 1935  MRN: [id number]                                                       Patient Diagnosis(es):   Patient Active Problem List    Diagnosis Date Noted    Vasculitis (HCC) 2024    Rash 2024    CATHRYN (acute kidney injury) (HCC) 2024    Intestinal intussusception (HCC) 2024    Metastatic melanoma (HCC) 2024    Metastasis to brain (HCC) 2024    Lung mass 2024       No past medical history on file.  Past Surgical History:   Procedure Laterality Date    CT NEEDLE BIOPSY LUNG PERCUTANEOUS W IMAGING GUIDANCE  2024    CT NEEDLE BIOPSY LUNG PERCUTANEOUS 2024 [facility] CT SCAN    KIDNEY TRANSPLANT         Reason for Referral:  Jonna Erickson  was referred for a Speech Therapy evaluation to assess swallow function and/or communication.    History of Present Illness  Per MD notes:  Jonna Erickson is a 89 y.o. female with history of kidney transplant, SIADH, left lower lobe lung mass and metastatic melanoma.  Who presents to the emergency department after being brought in by EMS for shortness of breath.  EMS reports patient has had increased shortness of breath over the past couple weeks.  Upon arrival EMS reported the O2 saturation was 80% on room air.  After applying 2 L of oxygen her O2 sat came up to 97%.  Patient denies having any chest pain, active shortness of breath or difficulty breathing, fevers, or hemoptysis.  After speaking with patient's family member, they report patient typically has an O2 saturation in the low 90s at baseline however today they noticed her requiring to take more breaths and having difficulty when drinking water.  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  Patient has undergone recent fractionated gamma knife based stereotactic radiosurgery to address this.  Patient also had left lower lobe mass on  present who reported no concerns re: swallowing. ROM of oral structures was WFL.  Pt somewhat lethargic and slow to respond to questions at times.  Pt able to cough and dry swallow on command. Pt analyzed with trials of ice chips, water by cup and straw, applesauce and cracker . Pt had no difficulty closing lips around spoon or drawing liquid up a straw.  Pt had mild difficulty with mastication with solids.  There was no anterior spillage or oral residue noted with any consistency. Pt demonstrated no s/s aspiration with any consistency presented.   Pt able to initiate swallow without difficulty with laryngeal movement observed.   Vocal quality remained clear throughout.  No coughing, throat clearing or choking observed with any consistency. Pt instructed to consume 3oz of water continuously, but did not fully follow instructions and was having some difficulty holding cup firmly to accomplish this task. Recommend dysphagia III/ soft and bite sized/ thin liquids due to concern for fatigue.     Instrumental assessment results   Will continue to monitor for need     Prognosis:  Prognosis for improvement: fair-good  Barriers to reach goals: premorbid status, hx metastatic CA    Treatment:  Dysphagia Goals:  The pt will be seen  1-3 x to address the following goals:   Goal 1: Patient will tolerate least restrictive diet with adequate oral prep and without overt signs of aspiration or associated decline in respiratory status.    Goal 2: Patient/caregiver will demonstrate understanding of dysphagia recommendations/concerns.  12/30-The pt and daughter were educated to purpose of the visit, anatomy and physiology of the swallow, s/s of  aspiration, swallowing strategies, diet recommendations, rational for soft and bite sized diet, and possibility of being made NPO if s/s aspiration emerge. The pt stated comprehension. con't goal          Education  Provided education to patient re: role of SLP, purpose of visit, and

## 2024-12-30 NOTE — PROGRESS NOTES
Smog enema given with minimal relief with only small amount of brown, hard stool removed. Pt still c/o abdominal cramping/discomfort. Dr. Schulz notified via [employer] of above information. Awaiting response. Electronically signed by Anabel Arroyo RN on 12/30/2024 at 1:43 PM

## 2024-12-30 NOTE — PROGRESS NOTES
12/27/24 2238     STREP PNEUMONIAE ANTIGEN, URINE --     Presumptive Negative  Presumptive negative suggests no current or recent  pneumococcal infection. Infection due to Strep pneumoniae  cannot be ruled out since the antigen present in the sample  may be below the detection limit of the test.  Normal Range:Presumptive Negative      Narrative:      ORDER#: [id number]                          ORDERED BY: JESSICA BORJA  SOURCE: Urine Clean Catch                  COLLECTED:  12/27/24 05:45  ANTIBIOTICS AT LENA.:                      RECEIVED :  12/27/24 15:27    Culture, Urine [[id number]]  (Abnormal)  (Susceptibility) Collected: 12/27/24 0545    Order Status: Completed Specimen: Urine, clean catch Updated: 12/29/24 0230     Organism Klebsiella pneumoniae     Urine Culture, Routine >100,000 CFU/ml    Narrative:      ORDER#: [id number]                          ORDERED BY: ELI ISABEL  SOURCE: Urine Clean Catch                  COLLECTED:  12/27/24 05:45  ANTIBIOTICS AT LENA.:                      RECEIVED :  12/27/24 15:23    Susceptibility        Klebsiella pneumoniae      BACTERIAL SUSCEPTIBILITY PANEL BY RAAD      ampicillin  Resistant      ampicillin-sulbactam 4 mcg/mL Sensitive      ceFAZolin <=4 mcg/mL Sensitive  [1]       cefepime <=0.12 mcg/mL Sensitive      cefTRIAXone <=0.25 mcg/mL Sensitive      ciprofloxacin <=0.25 mcg/mL Sensitive      ertapenem <=0.12 mcg/mL Sensitive      gentamicin <=1 mcg/mL Sensitive      levofloxacin <=0.12 mcg/mL Sensitive      nitrofurantoin 64 mcg/mL Intermediate      piperacillin-tazobactam <=4 mcg/mL Sensitive      trimethoprim-sulfamethoxazole <=20 mcg/mL Sensitive                   [1]  NOTE: Cefazolin should only be used for uncomplicated UTI        for E.coli or Klebsiella pneumoniae.                      Culture, Respiratory [[id number]]     Order Status: No result Specimen: Sputum Expectorated     Pneumonia Panel, Molecular [[id number]]     Order Status: No result Specimen:  Sputum Expectorated                Radiology:  US RENAL COMPLETE   Final Result      Polycystic atrophic native kidneys. There is a right lower quadrant transplant kidney. Parapelvic cysts and/or hydronephrosis transplant kidney with an adjacent fluid collection versus exophytic cyst.      Electronically signed by Prabhjot Hoffmann      CTA PULMONARY W CONTRAST   Final Result   1. No evidence of any pulmonary embolism.   2. Left lower lobe mass. This is worrisome for a primary lung cancer. Pathologic correlation is recommended.   3. Moderate to large layering right-sided pleural effusion with adjacent atelectasis.   4. Small to moderate size layering left-sided pleural effusion.   5. Polycystic kidneys.      Electronically signed by Moshe Coelho MD      XR CHEST (2 VW)   Final Result   Right pleural effusion.   Increasing right basilar infiltrate.      Electronically signed by Santiago Welsh                  Assessment & Plan   89 y.o. female who presented to the ED on 12/26 with shortness of breath.     Metastatic melanoma, brain and lung  S/p 7 sessions of fractionated gamma knife based stereotactic radiosurgery   Bilateral pleural effusion  Patient has left lower lung biopsy showing melanoma. Patient endorses cough and shortness of breath. Bilateral pleural effusion right>left on imaging. Patient was experiencing shortness of breath with pro-BNP elevated over 8K (up from 1K). Received 5 days of gamma knife recently.   -Continue Keppra  -x1 dose 40mg lasix, monitor Cr and continue diuresis of Cr doesn't increase  -Oncology consulted- holding off on further treatment/next steps until patient is more stable   - awaiting radiation oncology evaluation  - palliative consulted- will complete POA paperwork   - for now,  is primary decision maker    - patient has desired aggressive treatment in terms of her metastatic melanoma  -Pulmonology consulted   - Will wait for subjective improvement prior to considering

## 2024-12-30 NOTE — PROGRESS NOTES
Physical Therapy/Occupational Therapy  HOLD    Came to see pt for therapy.  Pt presently declining therapy due to \"not feeling up to it today\" and daughter confirms.  Will return per POC, nursing notified.    Francisca Choudhury PT [id number]   Jennifer Noguera, MOT-OTR/L [id number]

## 2024-12-30 NOTE — PROGRESS NOTES
Palliative Care Chart Review and Check in Note     NAME:  Jonna Erickson  Admit Date: 12/26/2024  Hospital Day: 5   Current Code status: Full Code    Palliative care is continuing to follow Mrs. Erickson for symptom management, and goals of care discussion as needed. Patient's chart reviewed today 12/30/24.      Patient's case was discussed with the oncology team.    Mrs. Erickson was seen at bedside with her son, Dr. Graham Erickson, present this morning and she expressed uncertainty regarding hospice.  Discussed returning this afternoon to discuss needs further.    Met with Mrs. Erickson at bedside along with her daughter, Negra Maher, this afternoon.  Mrs. Erickson did not want to discuss palliative or hospice care at that time.    The following are the currently established goals/code status, and symptom management.     Goals of care:  Life-prolonging/rehabilitative     Code status:  Full code. There is a note reflecting discussion with the primary team on 12/29 in which the patient was possibly considering DNR/DNI.  Did not discuss further at this time.    Discharge plan:  TBD - family's request for home health care with medical bed was discussed with case management.      Will follow-up with the patient and her family tomorrow, 12/31/24.    Mary Weiner MD, RENEA, CCRC  Internal Medicine, PGY-3

## 2024-12-30 NOTE — PROCEDURES
PROCEDURE NOTE  Date: 12/30/2024   Name: Jonna Erickson  YOB: 1935    Derm biopsy    Date/Time: 12/30/2024 10:13 AM    Performed by: Juliane Christopher MD  Authorized by: Juliane Christopher MD  Consent: Written consent obtained.  Risks and benefits: risks, benefits and alternatives were discussed  Consent given by: patient  Patient understanding: patient states understanding of the procedure being performed  Patient identity confirmed: verbally with patient  Preparation: Patient was prepped and draped in the usual sterile fashion.  Local anesthesia used: yes  Anesthesia: local infiltration    Anesthesia:  Local anesthesia used: yes  Local Anesthetic: lidocaine 1% without epinephrine  Anesthetic total: 10 mL    Sedation:  Patient sedated: no    Patient tolerance: patient tolerated the procedure well with no immediate complications        Skin lesion punch biopsy      After obtaining informed consent, the area was prepped and draped in the usual fashion.     Anesthesia was obtained with 1% lidocaine. A full thickness punch biopsy was obtained with a 4mm punch. Hemostasis was maintained with silver nitrate and pressure. Wound dressed with mepilex square. Wound care discussed with nursing.     3x specimen obtained - 1 in tissue fixative, 2 in formulin as first sample inadequate.     EBL 2 cc.     Juliane Christopher MD  PGY2, General Surgery  12/30/24   10:14 AM   [facility]  [phone]

## 2024-12-30 NOTE — PLAN OF CARE
Problem: SLP Adult - Impaired Swallowing  Goal: By Discharge: Advance to least restrictive diet without signs or symptoms of aspiration for planned discharge setting.  See evaluation for individualized goals.  Outcome: Progressing  Note: Intervention: Speech Evaluation/treatment  SLP completed evaluation. Please refer to notes in EMR.

## 2024-12-31 LAB
ALBUMIN SERPL-MCNC: 2.4 G/DL (ref 3.4–5)
ANION GAP SERPL CALCULATED.3IONS-SCNC: 13 MMOL/L (ref 3–16)
BASOPHILS # BLD: 0.1 K/UL (ref 0–0.2)
BASOPHILS NFR BLD: 0.8 %
BUN SERPL-MCNC: 72 MG/DL (ref 7–20)
CALCIUM SERPL-MCNC: 8 MG/DL (ref 8.3–10.6)
CHLORIDE SERPL-SCNC: 104 MMOL/L (ref 99–110)
CO2 SERPL-SCNC: 20 MMOL/L (ref 21–32)
CREAT SERPL-MCNC: 2.4 MG/DL (ref 0.6–1.2)
DEPRECATED RDW RBC AUTO: 20.4 % (ref 12.4–15.4)
EOSINOPHIL # BLD: 0.1 K/UL (ref 0–0.6)
EOSINOPHIL NFR BLD: 0.7 %
GFR SERPLBLD CREATININE-BSD FMLA CKD-EPI: 19 ML/MIN/{1.73_M2}
GLUCOSE SERPL-MCNC: 121 MG/DL (ref 70–99)
HCT VFR BLD AUTO: 32.6 % (ref 36–48)
HGB BLD-MCNC: 10.5 G/DL (ref 12–16)
LYMPHOCYTES # BLD: 1.5 K/UL (ref 1–5.1)
LYMPHOCYTES NFR BLD: 16.2 %
MAGNESIUM SERPL-MCNC: 2.07 MG/DL (ref 1.8–2.4)
MCH RBC QN AUTO: 29.5 PG (ref 26–34)
MCHC RBC AUTO-ENTMCNC: 32 G/DL (ref 31–36)
MCV RBC AUTO: 92.1 FL (ref 80–100)
MONOCYTES # BLD: 0.5 K/UL (ref 0–1.3)
MONOCYTES NFR BLD: 5.7 %
NEUTROPHILS # BLD: 7.2 K/UL (ref 1.7–7.7)
NEUTROPHILS NFR BLD: 76.6 %
PHOSPHATE SERPL-MCNC: 4.3 MG/DL (ref 2.5–4.9)
PLATELET # BLD AUTO: 126 K/UL (ref 135–450)
PMV BLD AUTO: 7.9 FL (ref 5–10.5)
POTASSIUM SERPL-SCNC: 4 MMOL/L (ref 3.5–5.1)
RBC # BLD AUTO: 3.54 M/UL (ref 4–5.2)
SODIUM SERPL-SCNC: 137 MMOL/L (ref 136–145)
WBC # BLD AUTO: 9.4 K/UL (ref 4–11)

## 2024-12-31 PROCEDURE — 36415 COLL VENOUS BLD VENIPUNCTURE: CPT

## 2024-12-31 PROCEDURE — 80069 RENAL FUNCTION PANEL: CPT

## 2024-12-31 PROCEDURE — 6360000002 HC RX W HCPCS

## 2024-12-31 PROCEDURE — 6370000000 HC RX 637 (ALT 250 FOR IP): Performed by: INTERNAL MEDICINE

## 2024-12-31 PROCEDURE — 2500000003 HC RX 250 WO HCPCS

## 2024-12-31 PROCEDURE — 83735 ASSAY OF MAGNESIUM: CPT

## 2024-12-31 PROCEDURE — 85025 COMPLETE CBC W/AUTO DIFF WBC: CPT

## 2024-12-31 PROCEDURE — 6370000000 HC RX 637 (ALT 250 FOR IP)

## 2024-12-31 PROCEDURE — 2060000000 HC ICU INTERMEDIATE R&B

## 2024-12-31 PROCEDURE — 92526 ORAL FUNCTION THERAPY: CPT

## 2024-12-31 RX ORDER — LORAZEPAM 0.5 MG/1
0.5 TABLET ORAL EVERY 6 HOURS PRN
Qty: 24 TABLET | Refills: 0 | Status: SHIPPED | OUTPATIENT
Start: 2024-12-31 | End: 2025-01-01

## 2024-12-31 RX ORDER — LORAZEPAM 2 MG/ML
0.5 INJECTION INTRAMUSCULAR EVERY 8 HOURS PRN
Status: DISCONTINUED | OUTPATIENT
Start: 2024-12-31 | End: 2025-01-01 | Stop reason: HOSPADM

## 2024-12-31 RX ORDER — HYDROXYZINE HYDROCHLORIDE 50 MG/ML
50 INJECTION, SOLUTION INTRAMUSCULAR EVERY 6 HOURS PRN
Status: DISCONTINUED | OUTPATIENT
Start: 2024-12-31 | End: 2024-12-31

## 2024-12-31 RX ORDER — HYDROXYZINE HYDROCHLORIDE 50 MG/ML
25 INJECTION, SOLUTION INTRAMUSCULAR EVERY 6 HOURS PRN
Status: DISCONTINUED | OUTPATIENT
Start: 2024-12-31 | End: 2025-01-01 | Stop reason: HOSPADM

## 2024-12-31 RX ORDER — POLYETHYLENE GLYCOL 3350 17 G/17G
17 POWDER, FOR SOLUTION ORAL DAILY PRN
COMMUNITY
Start: 2024-12-31 | End: 2025-01-30

## 2024-12-31 RX ORDER — OXYCODONE HYDROCHLORIDE 5 MG/1
2.5-5 TABLET ORAL EVERY 6 HOURS PRN
Qty: 15 TABLET | Refills: 0 | Status: SHIPPED | OUTPATIENT
Start: 2024-12-31 | End: 2025-01-01

## 2024-12-31 RX ADMIN — HEPARIN SODIUM 5000 UNITS: 5000 INJECTION INTRAVENOUS; SUBCUTANEOUS at 06:14

## 2024-12-31 RX ADMIN — AMLODIPINE BESYLATE 5 MG: 5 TABLET ORAL at 09:14

## 2024-12-31 RX ADMIN — LEVETIRACETAM 500 MG: 500 TABLET, FILM COATED ORAL at 09:14

## 2024-12-31 RX ADMIN — HYDROXYZINE HYDROCHLORIDE 25 MG: 50 INJECTION, SOLUTION INTRAMUSCULAR at 21:01

## 2024-12-31 RX ADMIN — SODIUM CHLORIDE, PRESERVATIVE FREE 10 ML: 5 INJECTION INTRAVENOUS at 09:15

## 2024-12-31 RX ADMIN — SODIUM CHLORIDE, PRESERVATIVE FREE 10 ML: 5 INJECTION INTRAVENOUS at 21:00

## 2024-12-31 ASSESSMENT — ENCOUNTER SYMPTOMS
ABDOMINAL PAIN: 1
SHORTNESS OF BREATH: 1
NAUSEA: 1
COUGH: 1

## 2024-12-31 ASSESSMENT — PAIN SCALES - GENERAL
PAINLEVEL_OUTOF10: 0
PAINLEVEL_OUTOF10: 0

## 2024-12-31 NOTE — PROGRESS NOTES
Ph: [phone], Fax: [phone]           [facility]               Reason for admission:    CATHRYN   Bilateral pleural effusion       Brief Summary :     Jonna Erickson is being seen by nephrology for CATHRYN.      Interval History and plan:      Patient seen with family in the room.  BP is elevated  Urine is 450 ml +   Creatinine is worsening 1.9 > 2.2 ( baseline is 1.0 )  Labs are pending from today     Plan:    -Urine protein and creatinine is 2.8 grams / day and likely from sirolimus ( Hold )  -Transplant kidney US reviewed. Has cyst   - Continue ABX   - Started amlodipine for high BP  - skin punch biopsy   - RFP daily, monitor creatinine closely   -Hold immunosuppressant for now in the face of melenoma.  - patient is considering hospice .  She has proteinuria and CATHRYN . She off all IS due to melanoma                      Assessment :   Acute Kidney Injury - unclear etiology   Transplant kidney in 2007 - on sirolimus, follows with Transplant nephrologist, Dr Marin Kowalski at   -Presented with a creatinine of 1.8 from a baseline of 0.8 on 12/05. Received contrast after the lab draw. Creatinine is 1.9 today. Recent CT abdomen/pelvis on 12/05/2024 revealed grossly hydronephrotic transplant  kidney with dilation of the renal pelvis and ureter. Repeat imaging 12/05 showed the renal pelvis and ureter are surrounded by either wall thickening or fluid defined by the walls. She has decreased appetite and PO intake. Etiology could be obstructive uropathy or prerenal. Acute rejection is considered but less likely. Recent contrast could further worsen CATHRYN.   Received 40 mg IV lasix on admission and had made only 350 ml urine so far. She is not on diuretics at home. Per son, she has been getting her sirolimus at home.   -Bladder scan Q shift   -Can consider echo given possible underlying heart failure with bilateral leg swelling, orthopnea and elevated BNP.  -Daily RFP   -Strict Is/Os  -Avoid NSAIDs

## 2024-12-31 NOTE — ACP (ADVANCE CARE PLANNING)
NP Paola Gross and I met with Mrs. Erickson and her family at bedside.  Mrs. Erickson expressed her wishes and confirmed an update to her code status to be DNR-CC.  Orders have been updated accordingly.  Discussed with Dr. Whatley.

## 2024-12-31 NOTE — PLAN OF CARE
Problem: Discharge Planning  Goal: Discharge to home or other facility with appropriate resources  Outcome: Progressing  Flowsheets (Taken 12/30/2024 0830 by Anabel Arroyo, RN)  Discharge to home or other facility with appropriate resources: Identify barriers to discharge with patient and caregiver     Problem: Safety - Adult  Goal: Free from fall injury  Outcome: Progressing  Flowsheets (Taken 12/30/2024 0830 by Anabel Arroyo, RN)  Free From Fall Injury: Instruct family/caregiver on patient safety     Problem: Chronic Conditions and Co-morbidities  Goal: Patient's chronic conditions and co-morbidity symptoms are monitored and maintained or improved  Outcome: Progressing  Flowsheets (Taken 12/30/2024 0830 by Anabel Arroyo, RN)  Care Plan - Patient's Chronic Conditions and Co-Morbidity Symptoms are Monitored and Maintained or Improved: Monitor and assess patient's chronic conditions and comorbid symptoms for stability, deterioration, or improvement     Problem: Respiratory - Adult  Goal: Achieves optimal ventilation and oxygenation  Outcome: Progressing  Flowsheets (Taken 12/30/2024 0918 by Anabel Arroyo, RN)  Achieves optimal ventilation and oxygenation: Assess for changes in respiratory status  Note: SpO2 95% 2 L NC     Problem: Cardiovascular - Adult  Goal: Maintains optimal cardiac output and hemodynamic stability  Outcome: Progressing  Flowsheets (Taken 12/30/2024 0830 by Anabel Arroyo, RN)  Maintains optimal cardiac output and hemodynamic stability: Monitor blood pressure and heart rate     Problem: Skin/Tissue Integrity  Goal: Absence of new skin breakdown  Description: 1.  Monitor for areas of redness and/or skin breakdown  2.  Assess vascular access sites hourly  3.  Every 4-6 hours minimum:  Change oxygen saturation probe site  4.  Every 4-6 hours:  If on nasal continuous positive airway pressure, respiratory therapy assess nares and determine need for appliance change or resting

## 2024-12-31 NOTE — PROGRESS NOTES
Internal Medicine Progress Note    Patient Name: Jonna Erickson   Patient : 1935   Date: 2024   Admit Date: 2024     CC: Shortness of Breath (Pt states they have increased SOB for the last couple weeks. Per EMS pt O2 saturation was 80% on RA when they arrived. Pt placed on 2L NC by EMS and stated O2 came up to 97%)       Interval History   Seen by hospice today and patient will be discharged with home hospice. Awaiting DME equipment to ready at home before discharge.    Pt more anxious today. On 2L oxygen for comfort. Several BM since disimpaction and enema.    - med/onc, pulm and palliative consult, will continue to appreciate recommendations    ROS   Review of Systems   Respiratory:  Positive for cough and shortness of breath.    Cardiovascular:  Positive for leg swelling. Negative for chest pain.   Gastrointestinal:  Positive for abdominal pain and nausea.          Objective     I/Os:  I/O last 3 completed shifts:  In: 570 [P.O.:540; I.V.:30]  Out: 1250 [Urine:1250]      Vital Signs:  Patient Vitals for the past 8 hrs:   BP Temp Temp src Pulse Resp SpO2 Weight   24 0912 137/81 98.4 °F (36.9 °C) Oral 90 17 96 % --   24 0600 -- -- -- 92 -- -- --   24 0556 -- -- -- -- -- -- 63.5 kg (139 lb 15.9 oz)       Physical Exam:  Physical Exam  Vitals reviewed.   Constitutional:       General: She is not in acute distress.     Appearance: She is ill-appearing.   HENT:      Head: Normocephalic and atraumatic.      Comments: Periorbital edema     Right Ear: External ear normal.      Left Ear: External ear normal.      Nose: Nose normal.      Mouth/Throat:      Mouth: Mucous membranes are dry.      Pharynx: Oropharynx is clear.   Eyes:      Pupils: Pupils are equal, round, and reactive to light.   Cardiovascular:      Rate and Rhythm: Normal rate and regular rhythm.      Pulses: Normal pulses.      Heart sounds: Normal heart sounds.   Pulmonary:      Effort: Pulmonary effort is normal.

## 2024-12-31 NOTE — ACP (ADVANCE CARE PLANNING)
Advanced Care Planning Note.    Purpose of Encounter: Advanced care planning in light of acute and chronic deteriorating medical conditions.    Parties In Attendance: Patient (Jonna Erickson), son Graham Erickson (POA), Marni Whatley MD  (myself)    Decisional Capacity: Yes    Subjective: Patient/family understand in this voluntary conversation that Jonna Erickson continues to deteriorate and discuss what inteventions and plans patient would want implemented in light of the following diagnoses:  Metastatic melanoma  Acute kidney injury patient posttransplant  Anasarca  Acute cystitis      Objective: Pt has been having chronic decline in functional status with increased dependence on others for ADLs  Increased frequency of Hospitalizations.  Likelihood of further hospitalizations with likelihood of escalation of medical interventions with the expectation of returning to a diminishing baseline level of functionality.      Discussion highlights: I discussed with patient the ramifications of their acute and chronic medical problems- and the likely outcomes expected, both in terms of statistics, and as part of my experience seeing patients with similar conditions.      Goals of Care Determination:    I discussed goals of care with patient's son, Dr. Calhoun, we discussed that patient has had significant decline in the last few months especially in the last 6 weeks.  When I saw her 6 weeks back in the hospital she was a different Jonna and today she is significantly deconditioned and appears fatigued tired.   I discussed about hospice and palliative approach and he is agreeable  I have asked hospice nurse to call him today to discuss how they can support.  Eventual goal is home with hospice so she can be around family  Patient's family will gather tomorrow morning  If they decide on hospice then we can arrange hospital bed and DME at home.    Plan: Continue  to educate patient on medical conditions and the choices available regarding possible outcomes with regard to goals of care and code status.     Hospice consult. Discuss Hospice care with case management.     Time spent on Advanced care Plannin+ minutes    Marni Whatley MD  2024

## 2024-12-31 NOTE — PROGRESS NOTES
[facility]:  Met with the pt, her , son and daughter to review hospice services.   They would like to go home with hospice care. The hospice consent has been signed effective for Jan. 1.   DME is being delivered tomorrow by noon.   Transportation is set up to take her home at 3 pm with [facility].     Pt's son requesting medication for anxiety.   Can pt please have Rx at discharge for lorazepam 0.5 mg po every 6 hours as needed #24; no refills.     DNR CC form placed on patient chart for MD signature.      Thank you,  Tova Butts RN [facility] [phone]

## 2024-12-31 NOTE — PROGRESS NOTES
Occupational Therapy/Physical      Pt and family have chosen comfort measures, and plan to D/C to [facility] tomorrow. OT signing off, please re-order as appropriate / needed.    Jennifer Noguera, MOT-OTR/L [id number]  Francisca Choudhury PT [phone]

## 2024-12-31 NOTE — PROGRESS NOTES
coughing when taking medication with water. Pt was admitted with shortness of breath, but pt is currently not SOB. Daughter is present who reported no concerns re: swallowing. ROM of oral structures was WFL.  Pt somewhat lethargic and slow to respond to questions at times.  Pt able to cough and dry swallow on command. Pt analyzed with trials of ice chips, water by cup and straw, applesauce and cracker . Pt had no difficulty closing lips around spoon or drawing liquid up a straw.  Pt had mild difficulty with mastication with solids.  There was no anterior spillage or oral residue noted with any consistency. Pt demonstrated no s/s aspiration with any consistency presented.   Pt able to initiate swallow without difficulty with laryngeal movement observed.   Vocal quality remained clear throughout.  No coughing, throat clearing or choking observed with any consistency. Pt instructed to consume 3oz of water continuously, but did not fully follow instructions and was having some difficulty holding cup firmly to accomplish this task. Recommend dysphagia III/ soft and bite sized/ thin liquids due to concern for fatigue.     Instrumental assessment results   Will continue to monitor for need       Treatment:  Dysphagia Goals:  The pt will be seen  1-3 x to address the following goals:   Goal 1: Patient will tolerate least restrictive diet with adequate oral prep and without overt signs of aspiration or associated decline in respiratory status.  12/31-goal met- RN reported no concerns re:swallowing or lung status.  and daughter present. Pt lethargic with eyes closed but would respond to most questions. Pt analyzed with trials of prune juice by straw, and peaches. Pt with no complaints re: soft and bite sized diet. Mastication mild-moderately prolonged with peaches. Pt had intermittent difficulty with drawing liquids up straw. Pt with one episode of coughing with liquid. Pt is scheduled to be discharged to home with  hospice care tomorrow.     Goal 2: Patient/caregiver will demonstrate understanding of dysphagia recommendations/concerns.  12/30-The pt and daughter were educated to purpose of the visit, anatomy and physiology of the swallow, s/s of  aspiration, swallowing strategies, diet recommendations, rational for soft and bite sized diet, and possibility of being made NPO if s/s aspiration emerge. The pt stated comprehension. con't goal  12//31- goal met-pt and family educated to the purpose of the visit, swallowing strategies (hand out provided for future reference) and diet recommendations. All indicated comprehension and had no further questions.            Education  Provided education to patient and family re: role of SLP, purpose of visit, and recommendations. All stated comprehension.        Total treatment time: 15 dysphagia treatment     Plan: Pt discharged from Speech Therapy services. Pt met all goals for therapy.    Recommended Diet: Dysphagia III/soft and bite sized with thin liquids-Make NPO if s/s aspiration emerge and alert SLP    Medication administration: whole with water    Strategies:   Upright for all po   Discharge Recommendation: No further follow up indicated with Speech Therapy- pt is being discharged to home with hospice care  Discussed with Freddie GIL  Needs met prior to leaving room, call light within reach    Negra Barbosa MA, CCC-SLP [id number]  Speech-Language Pathologist  Pager  [phone]    This document will serve as a dc summary if pt dc prior to next visit

## 2024-12-31 NOTE — CARE COORDINATION
Case Management Assessment            Discharge Note                    Date / Time of Note: 12/31/2024 11:51 AM                  Discharge Note Completed by: Mitchell Dalal RN    Patient Name: Jonna Erickson   YOB: 1935  Diagnosis: Shortness of breath [R06.02]  Pleural effusion [J90]  CATHRYN (acute kidney injury) (HCC) [N17.9]   Date / Time: 12/26/2024  4:56 PM    Current PCP: Michael Luna MD  Clinic patient: No    Hospitalization in the last 30 days: Yes  Readmission Assessment  Number of Days since last admission?: 8-30 days  Previous Disposition: Home with Family  Who is being Interviewed: Patient  What was the patient's/caregiver's perception as to why they think they needed to return back to the hospital?: Other (Comment) (new sx)  Did you visit your Primary Care Physician after you left the hospital, before you returned this time?: Yes  Did you see a specialist, such as Cardiac, Pulmonary, Orthopedic Physician, etc. after you left the hospital?: No  Who advised the patient to return to the hospital?: Self-referral  Does the patient report anything that got in the way of taking their medications?: No  In our efforts to provide the best possible care to you and others like you, can you think of anything that we could have done to help you after you left the hospital the first time, so that you might not have needed to return so soon?: Other (Comment) (na)    Advance Directives:  Code Status: Full Code  [place] DNR form completed and on chart: Not Indicated    Financial:  Payor: HU[employer] MEDICARE / Plan: HU[employer]NA CHOICE-PPO MEDICARE / Product Type: *No Product type* /      Pharmacy:    [facility]/pharmacy #[id number] - [place], [place] - [address] - P [phone] - F [phone]  [address]  [facility] [address]  Phone: [phone] Fax: [phone]      Assistance purchasing medications?: Potential Assistance Purchasing Medications: No  Assistance provided by Case Management: None at this 
        Case Management Assessment           Daily Note                 Date/ Time of Note: 12/30/2024 2:46 PM         Note completed by: Mitchell Dalal RN    Patient Name: Jonna Erickson  YOB: 1935    Diagnosis:Shortness of breath [R06.02]  Pleural effusion [J90]  CATHRYN (acute kidney injury) (HCC) [N17.9]  Patient Admission Status: Inpatient  Date of Admission:12/26/2024  4:56 PM    Length of Stay: 4 GLOS: GMLOS: 3.2 Readmission Risk Score: Readmission Risk Score: 20.2    Current Plan of Care: Echo pending, IV atbx, punch biopsy done today   ________________________________________________________________________________________  PT AM-PAC: 8 / 24 per last evaluation on: 12/27    OT AM-PAC: 14 / 24 per last evaluation on: 12/27     Recs for SNF  ________________________________________________________________________________________  Discharge Plan: To Be Determined DUE TO: family decision  Pre-cert required for SNF: YES  COVID Result:    Lab Results   Component Value Date/Time    COVID19 NOT DETECTED 11/08/2024 02:13 PM       Transportation PLAN for discharge:  tbd    Tentative discharge date: tbd    Potential assistance Purchasing Medications: Potential Assistance Purchasing Medications: No  Does Patient want to participate in local refill/ meds to beds program?:      Current barriers to discharge: Medical complications    Referrals completed: Home Health Care: [facility]      ________________________________________________________________________________________  Case Management Notes: palliative care met with patient today, not ready to discuss hospice or palliative at this time. Hospital bed order placed per family request. Referral sent to [facility] for HHC.     Jonna and her family were provided with choice of provider; she and her family are in agreement with the discharge plan.    Emergency Contacts:  Extended Emergency Contact Information  Primary Emergency Contact: Hamzah Erickson  Mobile Phone: 
11:51 AM  See Tova GIL note for hospice details.     Electronically signed by Mitchell Dalal RN, CM on 12/31/2024 at 11:51 AM.  Phone: [phone]  Fax: [phone]      9:41 AM  [facility] LOUISE Bernardo meeting with patient and family at 1030.    Electronically signed by Mitchell Dalal RN, CM on 12/31/2024 at 9:48 AM.  Phone: [phone]  Fax: [phone]    
ARLEY received call from Yissel with [facility]- states she spoke with Dr. Whatley & family is now agreeable to discuss hospice with plan to meet tomorrow. Yissel sent info to intake department and spoke with pt's son. Tova MEDRANO with [facility] will f/u tomorrow.    Thank you  Cat Hari RN, BSN,   ICU   [phone]    
following.       The Plan for Transition of Care is related to the following treatment goals of Shortness of breath [R06.02]  Pleural effusion [J90]  CATHRYN (acute kidney injury) (HCC) [N17.9]    IF APPLICABLE: The Patient and/or patient representative Jonna and her family were provided with a choice of provider and agrees with the discharge plan. Freedom of choice list with basic dialogue that supports the patient's individualized plan of care/goals and shares the quality data associated with the providers was provided to: Patient   Patient Representative Name:       The Patient and/or Patient Representative Agree with the Discharge Plan? Yes    Mitchell Dalal RN  Case Management Department  Ph: [phone] Fax: [phone]

## 2025-01-01 VITALS
OXYGEN SATURATION: 95 % | SYSTOLIC BLOOD PRESSURE: 152 MMHG | WEIGHT: 140.43 LBS | TEMPERATURE: 97.8 F | HEIGHT: 66 IN | DIASTOLIC BLOOD PRESSURE: 71 MMHG | RESPIRATION RATE: 16 BRPM | HEART RATE: 80 BPM | BODY MASS INDEX: 22.57 KG/M2

## 2025-01-01 LAB
ALBUMIN SERPL-MCNC: 2.2 G/DL (ref 3.4–5)
ANION GAP SERPL CALCULATED.3IONS-SCNC: 11 MMOL/L (ref 3–16)
BASOPHILS # BLD: 0.1 K/UL (ref 0–0.2)
BASOPHILS NFR BLD: 0.8 %
BUN SERPL-MCNC: 73 MG/DL (ref 7–20)
CALCIUM SERPL-MCNC: 7.9 MG/DL (ref 8.3–10.6)
CHLORIDE SERPL-SCNC: 105 MMOL/L (ref 99–110)
CO2 SERPL-SCNC: 22 MMOL/L (ref 21–32)
CREAT SERPL-MCNC: 2.6 MG/DL (ref 0.6–1.2)
DEPRECATED RDW RBC AUTO: 19.9 % (ref 12.4–15.4)
EOSINOPHIL # BLD: 0.1 K/UL (ref 0–0.6)
EOSINOPHIL NFR BLD: 1 %
GFR SERPLBLD CREATININE-BSD FMLA CKD-EPI: 17 ML/MIN/{1.73_M2}
GLUCOSE SERPL-MCNC: 82 MG/DL (ref 70–99)
HCT VFR BLD AUTO: 26.4 % (ref 36–48)
HGB BLD-MCNC: 8.6 G/DL (ref 12–16)
LYMPHOCYTES # BLD: 1 K/UL (ref 1–5.1)
LYMPHOCYTES NFR BLD: 15.2 %
MAGNESIUM SERPL-MCNC: 2.1 MG/DL (ref 1.8–2.4)
MCH RBC QN AUTO: 29.3 PG (ref 26–34)
MCHC RBC AUTO-ENTMCNC: 32.6 G/DL (ref 31–36)
MCV RBC AUTO: 89.9 FL (ref 80–100)
MONOCYTES # BLD: 0.4 K/UL (ref 0–1.3)
MONOCYTES NFR BLD: 5.7 %
NEUTROPHILS # BLD: 5 K/UL (ref 1.7–7.7)
NEUTROPHILS NFR BLD: 77.3 %
PHOSPHATE SERPL-MCNC: 4.4 MG/DL (ref 2.5–4.9)
PLATELET # BLD AUTO: 124 K/UL (ref 135–450)
PMV BLD AUTO: 7.7 FL (ref 5–10.5)
POTASSIUM SERPL-SCNC: 4 MMOL/L (ref 3.5–5.1)
RBC # BLD AUTO: 2.94 M/UL (ref 4–5.2)
SODIUM SERPL-SCNC: 138 MMOL/L (ref 136–145)
WBC # BLD AUTO: 6.4 K/UL (ref 4–11)

## 2025-01-01 PROCEDURE — 36415 COLL VENOUS BLD VENIPUNCTURE: CPT

## 2025-01-01 PROCEDURE — 80069 RENAL FUNCTION PANEL: CPT

## 2025-01-01 PROCEDURE — 85025 COMPLETE CBC W/AUTO DIFF WBC: CPT

## 2025-01-01 PROCEDURE — 83735 ASSAY OF MAGNESIUM: CPT

## 2025-01-01 PROCEDURE — 2500000003 HC RX 250 WO HCPCS

## 2025-01-01 RX ORDER — LORAZEPAM 0.5 MG/1
0.5 TABLET ORAL EVERY 6 HOURS PRN
Qty: 24 TABLET | Refills: 0 | Status: SHIPPED | OUTPATIENT
Start: 2025-01-01 | End: 2025-01-07

## 2025-01-01 RX ORDER — OXYCODONE HYDROCHLORIDE 5 MG/1
2.5-5 TABLET ORAL EVERY 6 HOURS PRN
Qty: 15 TABLET | Refills: 0 | Status: CANCELLED | OUTPATIENT
Start: 2025-01-01 | End: 2025-01-06

## 2025-01-01 RX ORDER — OXYCODONE HYDROCHLORIDE 5 MG/1
2.5-5 TABLET ORAL EVERY 6 HOURS PRN
Qty: 15 TABLET | Refills: 0
Start: 2025-01-01 | End: 2025-01-06

## 2025-01-01 RX ADMIN — SODIUM CHLORIDE, PRESERVATIVE FREE 10 ML: 5 INJECTION INTRAVENOUS at 07:54

## 2025-01-01 NOTE — PLAN OF CARE
Problem: Discharge Planning  Goal: Discharge to home or other facility with appropriate resources  Outcome: Progressing  Flowsheets (Taken 1/1/2025 0035)  Discharge to home or other facility with appropriate resources:   Identify barriers to discharge with patient and caregiver   Arrange for needed discharge resources and transportation as appropriate   Identify discharge learning needs (meds, wound care, etc)     Problem: Safety - Adult  Goal: Free from fall injury  Outcome: Progressing  Free From Fall Injury: Instruct family/caregiver on patient safety     Problem: Chronic Conditions and Co-morbidities  Goal: Patient's chronic conditions and co-morbidity symptoms are monitored and maintained or improved  Outcome: Progressing  Care Plan - Patient's Chronic Conditions and Co-Morbidity Symptoms are Monitored and Maintained or Improved: Monitor and assess patient's chronic conditions and comorbid symptoms for stability, deterioration, or improvement     Problem: Pain  Goal: Verbalizes/displays adequate comfort level or baseline comfort level  Outcome: Progressing  Flowsheets (Taken 1/1/2025 0035)  Verbalizes/displays adequate comfort level or baseline comfort level:   Encourage patient to monitor pain and request assistance   Assess pain using appropriate pain scale   Administer analgesics based on type and severity of pain and evaluate response

## 2025-01-01 NOTE — PROGRESS NOTES
Ph: [phone], Fax: [phone]           [facility]               Reason for admission:    CATHRYN   Bilateral pleural effusion       Brief Summary :     Jonna Erickson is being seen by nephrology for CATHRYN.      Interval History and plan:      Patient seen with family in the room.  BP is elevated  Urine is 450 ml +   Creatinine is worsening 1.9 > 2.2 > 2.6 ( baseline is 1.0 )    Plan:    - Urine protein and creatinine is 2.8 grams / day and likely from sirolimus ( Hold )  - Transplant kidney US with cyst but no acute pathology  - Continue ABX   - Started amlodipine for high BP but ok with 140s/70s for renal perfusion which is where she is mostly.   - skin punch biopsy pending.   - RFP daily, monitor creatinine closely   - Hold immunosuppressant in the face of melenoma. Sirolimus has LOWER incidence of skin cancer than Tacrolimus.   - patient is considering hospice.  Holding on renal biopsy at this time.                      Assessment :   Acute Kidney Injury - unclear etiology   Transplant kidney in 2007 - on sirolimus, follows with Transplant nephrologist, Dr Marin Kowalski at   -Presented with a creatinine of 1.8 from a baseline of 0.8 on 12/05. Received contrast after the lab draw. Creatinine is 1.9 today. Recent CT abdomen/pelvis on 12/05/2024 revealed grossly hydronephrotic transplant  kidney with dilation of the renal pelvis and ureter. Repeat imaging 12/05 showed the renal pelvis and ureter are surrounded by either wall thickening or fluid defined by the walls. She has decreased appetite and PO intake. Etiology could be obstructive uropathy or prerenal. Acute rejection is considered but less likely. Recent contrast could further worsen CATHRYN.   Received 40 mg IV lasix on admission and had made only 350 ml urine so far. She is not on diuretics at home. Per son, she has been getting her sirolimus at home.   -Bladder scan Q shift   -Can consider echo given possible underlying heart failure with  mL IV syringe, 1,000 mg, IntraVENous, Q24H  azithromycin (ZITHROMAX) 250 mg in sodium chloride 0.9 % 250 mL IVPB, 250 mg, IntraVENous, Q24H  ondansetron (ZOFRAN-ODT) disintegrating tablet 8 mg, 8 mg, Oral, Q8H PRN **OR** ondansetron (ZOFRAN) injection 8 mg, 8 mg, IntraVENous, Q6H PRN  sodium chloride flush 0.9 % injection 5-40 mL, 5-40 mL, IntraVENous, 2 times per day  sodium chloride flush 0.9 % injection 5-40 mL, 5-40 mL, IntraVENous, PRN  0.9 % sodium chloride infusion, , IntraVENous, PRN  polyethylene glycol (GLYCOLAX) packet 17 g, 17 g, Oral, Daily PRN  acetaminophen (TYLENOL) tablet 650 mg, 650 mg, Oral, Q6H PRN **OR** acetaminophen (TYLENOL) suppository 650 mg, 650 mg, Rectal, Q6H PRN  heparin (porcine) injection 5,000 Units, 5,000 Units, SubCUTAneous, 3 times per day  levETIRAcetam (KEPPRA) tablet 500 mg, 500 mg, Oral, BID    Vitals :     Vitals:    01/01/25 0752   BP: (!) 152/71   Pulse: 80   Resp: 16   Temp: 97.8 °F (36.6 °C)   SpO2: 95%          Physical Examination :     appearance: Alert, orientated  Respiratory: no distress, decreased breath sound bilateral lung bases   Cardiovascular: no visibly raised JVD, 2+ bilateral pitting edema up to below the knees  Abdomen: -  soft, non tender, non distended   Other relevant findings: appears fatigued and takes time to respond to questions      Labs :     CBC:   Recent Labs     12/30/24  0701 12/31/24  1000 01/01/25  0531   WBC 7.9 9.4 6.4   HGB 10.4* 10.5* 8.6*   HCT 31.7* 32.6* 26.4*   * 126* 124*     BMP:    Recent Labs     12/30/24  0701 12/31/24  1000 01/01/25  0531    137 138   K 3.9 4.0 4.0    104 105   CO2 21 20* 22   BUN 69* 72* 73*   CREATININE 2.2* 2.4* 2.6*   GLUCOSE 77 121* 82   MG 1.94 2.07 2.10   PHOS 4.0 4.3 4.4     Lab Results   Component Value Date/Time    COLORU Yellow 12/27/2024 10:04 AM    NITRU Negative 12/27/2024 10:04 AM    GLUCOSEU Negative 12/27/2024 10:04 AM    KETUA Negative 12/27/2024 10:04 AM    UROBILINOGEN 0.2

## 2025-01-01 NOTE — PROGRESS NOTES
[facility]:  Plan remains for the patient to go home today under hospice care.   Transportation is set for 3 pm today with [facility]. LOUISE León is aware.   Rx's reviewed with the Medical Team and with the family.   [facility] RN will go out to the home this evening.     Thank you,  Tova -[phone]

## 2025-01-01 NOTE — DISCHARGE SUMMARY
INTERNAL MEDICINE DEPARTMENT  DISCHARGE SUMMARY    Patient ID: Jonna Erickson                                             Discharge Date: 1/1/2025   Patient's PCP: Michael Luna MD                                          Discharge Physician: Marni westbrook MD  Admit Date: 12/26/2024   Admitting Physician: Gita Sanchez MD    PROBLEMS DURING HOSPITALIZATION:  Present on Admission:   CATHRYN (acute kidney injury) (HCC)   Vasculitis (HCC)   Rash      DISCHARGE DIAGNOSES:  Metastatic Melanoma  Acute Kidney Injury  Urinary Tract Infection    Hospital Course:    Metastatic melanoma, brain and lung  Patient recently was diagnosed with metastatic melenoma to the lungs and brain. She received gamma knife x 5 days prior to being admitted. On presentation she had severe anasarca but was alert and without acute complaints. CT chest redeonstrated the 8 cm lung melanoma as well as increased plueral effusions compared to prior. Pulmonology consulted for the pleural effusions. No thoracentesis elected at the patient was not hypoxic or unstable. Oncology and Radiation oncology was also consulted for input into what options the patient would have in treatment for her melanoma. Hospice was consulted given the patients terminal diagnosis. Several discussions took place with the patient, her family and the healthcare team. Ultimately the patient elected to pursue home hospice. She was discharged home with comfort medications.     CATHRYN  UTI  S/p kidney transplant 2007  Patient was on sirolimus for rejection prevention chronically but this was stopped recently due to her new melanoma diagnosis. She presented with concerning renal function. Nephrology was consulted. It was discussed that the sirolimus cannot be started due to concerns for worsening of her melanoma and she cannot start any type of immunotherapy either as it would likely cause transplant rejection. The patient received lasix with moderate success in relieving her anasarca 
As certified below, I, or a nurse practitioner or physician assistant working with me, had a face-to-face encounter that meets the physician face-to-face encounter requirements.

## 2025-01-01 NOTE — PROGRESS NOTES
I have seen, examined and evaluated the patient as did the resident physician, on 1/1/25. We have discussed the plan of care and decisions made during that discussion were incorporated into this note. I have reviewed the resident physician's note and agree with the assessment and plan of care as documented.  Patient's  and daughter present at bedside  She appears calm  No signs of respiratory distress  She can only request to me today as if she can get her left upper extremity peripheral IV removed.  Appears comfortable, not requiring much comfort medications  Discussed with hospice nurse, Ativan prescription has been sent to her outpatient preferred pharmacy  This pharmacy is open 24/7  She is on Keppra cannot be crushed or chewed, I discussed with hospice nurse that if she has had trouble swallowing then we need to change to liquid form    Discharge medication reconciliation completed  Patient was discharged with home hospice today    Comment: Please note this report has been produced using speech recognition software and may contain errors related to that system including errors in grammar, punctuation, and spelling, as well as words and phrases that may be inappropriate. If there are any questions or concerns, please feel free to contact the dictating provider for clarification.     My time spent reviewing discharge plan and follow ups with resident, along with performing independent review of discharge medicines along with counseling the patient 35 minutes.    Marni Whatley MD  Hospitalist  Attending Physician

## 2025-01-01 NOTE — PLAN OF CARE
Problem: Safety - Adult  Goal: Free from fall injury  1/1/2025 1119 by Romelia Damon RN  Outcome: Progressing  Flowsheets (Taken 1/1/2025 1119)  Free From Fall Injury: Instruct family/caregiver on patient safety     Problem: ABCDS Injury Assessment  Goal: Absence of physical injury  Outcome: Progressing  Flowsheets (Taken 1/1/2025 1119)  Absence of Physical Injury: Implement safety measures based on patient assessment     Problem: Respiratory - Adult  Goal: Achieves optimal ventilation and oxygenation  Outcome: Progressing  Flowsheets (Taken 1/1/2025 1119)  Achieves optimal ventilation and oxygenation:   Assess for changes in respiratory status   Assess for changes in mentation and behavior

## 2025-01-02 LAB
ANA PATTERN 2: ABNORMAL
ANA PATTERN: ABNORMAL
ANA TITER 2: ABNORMAL
ANA TITER: ABNORMAL
ANTINUCLEAR AB INTERPRETIVE COMMENT: ABNORMAL
NUCLEAR IGG SER QL IF: DETECTED